# Patient Record
Sex: FEMALE | Race: WHITE | NOT HISPANIC OR LATINO | Employment: OTHER | URBAN - METROPOLITAN AREA
[De-identification: names, ages, dates, MRNs, and addresses within clinical notes are randomized per-mention and may not be internally consistent; named-entity substitution may affect disease eponyms.]

---

## 2017-01-09 RX ORDER — SIMVASTATIN 40 MG/1
TABLET, FILM COATED ORAL
Qty: 30 TABLET | Refills: 1 | Status: SHIPPED | OUTPATIENT
Start: 2017-01-09 | End: 2017-04-17 | Stop reason: SDUPTHER

## 2017-01-11 ENCOUNTER — OFFICE VISIT (OUTPATIENT)
Dept: PODIATRY | Facility: CLINIC | Age: 82
End: 2017-01-11
Payer: MEDICARE

## 2017-01-11 ENCOUNTER — TELEPHONE (OUTPATIENT)
Dept: PODIATRY | Facility: CLINIC | Age: 82
End: 2017-01-11

## 2017-01-11 VITALS
WEIGHT: 180 LBS | SYSTOLIC BLOOD PRESSURE: 130 MMHG | BODY MASS INDEX: 37.79 KG/M2 | HEIGHT: 58 IN | DIASTOLIC BLOOD PRESSURE: 74 MMHG

## 2017-01-11 DIAGNOSIS — M79.674 TOE PAIN, RIGHT: ICD-10-CM

## 2017-01-11 DIAGNOSIS — S92.501A FRACTURE OF FIFTH TOE, RIGHT, CLOSED, INITIAL ENCOUNTER: Primary | ICD-10-CM

## 2017-01-11 DIAGNOSIS — E11.49 TYPE II DIABETES MELLITUS WITH NEUROLOGICAL MANIFESTATIONS: ICD-10-CM

## 2017-01-11 PROCEDURE — 1157F ADVNC CARE PLAN IN RCRD: CPT | Mod: S$GLB,,, | Performed by: PODIATRIST

## 2017-01-11 PROCEDURE — 1125F AMNT PAIN NOTED PAIN PRSNT: CPT | Mod: S$GLB,,, | Performed by: PODIATRIST

## 2017-01-11 PROCEDURE — 3078F DIAST BP <80 MM HG: CPT | Mod: S$GLB,,, | Performed by: PODIATRIST

## 2017-01-11 PROCEDURE — 1160F RVW MEDS BY RX/DR IN RCRD: CPT | Mod: S$GLB,,, | Performed by: PODIATRIST

## 2017-01-11 PROCEDURE — 3075F SYST BP GE 130 - 139MM HG: CPT | Mod: S$GLB,,, | Performed by: PODIATRIST

## 2017-01-11 PROCEDURE — 99213 OFFICE O/P EST LOW 20 MIN: CPT | Mod: S$GLB,,, | Performed by: PODIATRIST

## 2017-01-11 PROCEDURE — 99999 PR PBB SHADOW E&M-EST. PATIENT-LVL III: CPT | Mod: PBBFAC,,, | Performed by: PODIATRIST

## 2017-01-11 PROCEDURE — 99499 UNLISTED E&M SERVICE: CPT | Mod: S$GLB,,, | Performed by: PODIATRIST

## 2017-01-11 PROCEDURE — 1159F MED LIST DOCD IN RCRD: CPT | Mod: S$GLB,,, | Performed by: PODIATRIST

## 2017-01-11 RX ORDER — DESONIDE 0.5 MG/G
OINTMENT TOPICAL
Refills: 1 | COMMUNITY
Start: 2016-10-05

## 2017-01-11 RX ORDER — HYDROCODONE BITARTRATE AND ACETAMINOPHEN 5; 325 MG/1; MG/1
1 TABLET ORAL EVERY 12 HOURS PRN
Qty: 30 TABLET | Refills: 0 | Status: SHIPPED | OUTPATIENT
Start: 2017-01-11 | End: 2017-03-07

## 2017-01-11 NOTE — PROGRESS NOTES
Subjective:      Patient ID: Ngoc Cruz is a 85 y.o. female.    Chief Complaint: Foot Pain (right foot little pcp Dr. Saleh 03/2016)    Ngoc is a 85 y.o. female who presents to the clinic for evaluation and treatment of high risk feet. Ngoc has a past medical history of Cataract; Diabetes mellitus type II; Glaucoma; Hyperlipidemia; Hypertension; and Osteoporosis. The patient's chief complaint is fracture of 5th toe right foot. Went to [facility] ED where they took an xray and confirmed broken toe. She was given a protective boot (she does not have it on today) and pain medication. Relates no pain in the toe, just some swelling. Does not recall any injury to the toe. Denies stubbing toe, denies dropping anything on it.  This patient has documented high risk feet requiring routine maintenance secondary to diabetes mellitis and those secondary complications of diabetes, as mentioned..    PCP: Hector Saleh MD    Date Last Seen by PCP:   Chief Complaint   Patient presents with    Foot Pain     right foot little pcp Dr. Saleh 03/2016       Current shoe gear:   Rx diabetic extra depth shoes and custom accommodative insoles    Hemoglobin A1C   Date Value Ref Range Status   02/29/2016 7.6 (A)  Final   08/05/2015 8.3 (H) 4.5 - 6.2 % Final   02/12/2015 8.9 (H) 4.5 - 6.2 % Final   08/11/2014 8.0 (H) 4.5 - 6.2 % Final       Patient Active Problem List   Diagnosis    Diabetes mellitus, type II    Hyperlipidemia    Hypertension    Osteoporosis, postmenopausal    Anxiety    Epidermal inclusion cyst    Skin lesion    Right foot pain    Urinary retention    Acute right hip pain    Right-sided low back pain with right-sided sciatica    SOB (shortness of breath)    Elevated d-dimer       Current Outpatient Prescriptions on File Prior to Visit   Medication Sig Dispense Refill    ACCU-CHEK FASTCLIX Misc   5    alendronate (FOSAMAX) 70 MG tablet   6    amlodipine (NORVASC) 10 MG tablet Take 1 tablet (10 mg  "total) by mouth once daily. 30 tablet 11    azithromycin (Z-TREVA) 250 MG tablet Take 2 tablets by mouth on day 1; Take 1 tablet by mouth on days 2-5 6 tablet 0    benzonatate (TESSALON PERLES) 100 MG capsule Take 1 capsule (100 mg total) by mouth 3 (three) times daily as needed for Cough. 30 capsule 1    blood sugar diagnostic (ACCU-CHEK SMARTVIEW TEST STRIP) Strp use with insulin AS DIRECTED as directed 100 strip 7    blood sugar diagnostic Strp test AS DIRECTED  0    blood-glucose meter (ACCU-CHEK DORCAS) Mis Please provide glucometer covered by the insurance company 1 each 1    dextromethorphan-guaifenesin  mg (MUCINEX DM)  mg per 12 hr tablet Take 1 tablet by mouth every 12 (twelve) hours.      diazepam (VALIUM) 2 MG tablet Take 1 tablet (2 mg total) by mouth daily as needed for Anxiety. 30 tablet 0    EASY TOUCH TWIST LANCETS 32 gauge Misc test DAILY AS DIRECTED  7    escitalopram oxalate (LEXAPRO) 10 MG tablet Take 1 tablet (10 mg total) by mouth once daily. 30 tablet 2    fluconazole (DIFLUCAN) 200 MG Tab Take 1 tablet (200 mg total) by mouth once daily. 2 tablet 0    fluocinonide (LIDEX) 0.05 % ointment 1 application 2 (two) times daily. Apply to affected area  0    FREESTYLE LANCETS 28 gauge lancets USE DAILY 100 each 5    gabapentin (NEURONTIN) 100 MG capsule Take 100 mg by mouth 3 (three) times daily.      hydrocodone-acetaminophen 7.5-325mg (NORCO) 7.5-325 mg per tablet Take 1 tablet by mouth every 6 (six) hours as needed. 15 tablet 0    insulin needles, disposable, (NOVOFINE 32) 32 x 1/4 " Ndle Inject 1 Units into the skin once daily. 100 each 1    lancets (FREESTYLE LANCETS) 28 gauge Misc Inject 1 lancet into the skin once daily. 100 each 1    LANTUS SOLOSTAR 100 unit/mL (3 mL) InPn pen inject 32 UNITS IN THE MORNING 9 mL 11    latanoprost (XALATAN) 0.005 % ophthalmic solution Place 1 drop into both eyes every evening.        lisinopril (PRINIVIL,ZESTRIL) 40 MG tablet Take " "1 tablet (40 mg total) by mouth once daily. 90 tablet 3    metoprolol tartrate (LOPRESSOR) 25 MG tablet Take 1 tablet (25 mg total) by mouth 2 (two) times daily. 60 tablet 11    miconazole (MICOTIN) 2 % cream Apply topically 2 (two) times daily. 30 g 0    naproxen (NAPROSYN) 500 MG tablet Take 1 tablet (500 mg total) by mouth 2 (two) times daily with meals. 60 tablet 2    nitrofurantoin (MACRODANTIN) 100 MG capsule Take 100 mg by mouth 4 (four) times daily.      NOVOFINE 32 32 gauge x 1/4" Ndle Inject 1 Units into the skin once daily. 100 each 0    NOVOFINE 32 32 gauge x 1/4" Ndle Inject 1 Units into the skin once daily. 100 each 1    polyethylene glycol (GLYCOLAX) 17 gram/dose powder   0    simvastatin (ZOCOR) 40 MG tablet TAKE ONE TABLET BY MOUTH IN THE EVENING 30 tablet 1    tizanidine 2 mg Cap Take by mouth.      triamcinolone acetonide 0.1% (KENALOG) 0.1 % ointment   0     No current facility-administered medications on file prior to visit.        Review of patient's allergies indicates:   Allergen Reactions    Bactrim [sulfamethoxazole-trimethoprim]     Cephalexin Other (See Comments)     Leg cramps    Codeine Itching    Demerol [meperidine]     Etodolac Diarrhea    Metformin Diarrhea    Naproxen     Sulfur     Terbinafine     Darvocet a500 [propoxyphene n-acetaminophen] Rash       Past Surgical History   Procedure Laterality Date    Cholecystectomy      Eye surgery      Hemorrhoid surgery      Hysterectomy      Vulvectomy      Excision thigh masses      Excision left axillary mass         Family History   Problem Relation Age of Onset    Diabetes Mother     Diabetes Son     Breast cancer Neg Hx     Colon cancer Neg Hx     Ovarian cancer Neg Hx        Social History     Social History    Marital status:      Spouse name: N/A    Number of children: N/A    Years of education: N/A     Occupational History    Not on file.     Social History Main Topics    Smoking status: " "Never Smoker    Smokeless tobacco: Never Used    Alcohol use No    Drug use: No    Sexual activity: Not Currently     Other Topics Concern    Not on file     Social History Narrative       Review of Systems   Constitution: Negative for chills, fever and weakness.   Cardiovascular: Positive for leg swelling (RLE). Negative for chest pain and claudication.        Varicosities   Respiratory: Negative for cough and shortness of breath.    Skin: Negative.  Negative for itching and rash.   Musculoskeletal: Positive for arthritis, back pain, joint pain and myalgias. Negative for falls, joint swelling and muscle weakness.        Toe swelling/pain   Gastrointestinal: Negative for diarrhea, nausea and vomiting.   Neurological: Positive for numbness and paresthesias. Negative for tremors.   Psychiatric/Behavioral: Negative for altered mental status and hallucinations.           Objective:       Vitals:    01/11/17 1311   BP: 130/74   Weight: 81.6 kg (180 lb)   Height: 4' 10" (1.473 m)   PainSc:   5   PainLoc: Toe       Physical Exam   Constitutional:  Non-toxic appearance. She does not have a sickly appearance. No distress.   Pt. is well-developed, well-nourished, appears stated age, in no acute distress, alert and oriented x 3. No evidence of depression, anxiety, or agitation. Calm, cooperative, and communicative. Appropriate interactions and affect.   Cardiovascular:   Pulses:       Dorsalis pedis pulses are 1+ on the right side, and 1+ on the left side.        Posterior tibial pulses are 1+ on the right side, and 1+ on the left side.    Dorsalis pedis and posterior tibial pulses are diminished Bilaterally. Toes are cool to touch. Feet are warm proximally.There is decreased digital hair. Skin is atrophic,mildly edematous toeral ankles       Pulmonary/Chest: No respiratory distress.   Musculoskeletal:        Right ankle: No tenderness. No lateral malleolus, no medial malleolus, no AITFL, no CF ligament and no posterior " TFL tenderness found. Achilles tendon exhibits no pain, no defect and normal Sotelo's test results.        Left ankle: No tenderness. No lateral malleolus, no medial malleolus, no AITFL, no CF ligament and no posterior TFL tenderness found. Achilles tendon exhibits no pain, no defect and normal Sotelo's test results.        Right foot: There is no tenderness and no bony tenderness.        Left foot: There is no tenderness and no bony tenderness.    Decreased stride, station of gait.  apropulsive toe off.  Increased angle and base of gait.      Patient has hammertoes of digits 2-5 bilateral partially reducible without symptom today.     Visible and palpable bunion without pain at dorsomedial 1st metatarsal head right and left.  Hallux abducted right and left partially reducible, tracks laterally without being track bound.  No ecchymosis, erythema, edema, or cardinal signs infection or signs of trauma same foot.     Fat pad atrophy to heels and met heads bilateral    Mild-moderate edema to 5th toe and lateral forefoot right. There is pain to palpation of base of 5th toe R foot. Pain on ROM of 5th MTPJ R.    Lymphadenopathy:   No lymphatic streaking    Negative lymphadenopathy bilateral popliteal fossa and tarsal tunnel.     Neurological:   [place]-Poly 5.07 monofilament is intact bilateral feet. Sharp/dull sensation is also intact Bilateral feet. Proprioception is grossly intact. Vibratory sensation intact (pt able to sense vibration stop within 3-5 seconds)     Skin: Skin is warm, dry and intact. No abrasion, no bruising, no burn, no ecchymosis and no rash noted. She is not diaphoretic. No cyanosis or erythema. No pallor. Nails show no clubbing.   Toenails x6  bilaterally are dystrophic but well trimmed.              Psychiatric: Her mood appears not anxious. Her affect is not inappropriate. Her speech is not slurred. She is not combative. She is communicative. She is attentive.   Nursing note reviewed.       "  Assessment:       Encounter Diagnoses   Name Primary?    Fracture of fifth toe, right, closed, initial encounter Yes    Toe pain, right     Type II diabetes mellitus with neurological manifestations          Plan:       Ngoc was seen today for foot pain.    Diagnoses and all orders for this visit:    Fracture of fifth toe, right, closed, initial encounter  -     X-Ray Foot Complete Right; Future    Toe pain, right  -     X-Ray Foot Complete Right; Future    Type II diabetes mellitus with neurological manifestations      I counseled the patient on her conditions, their implications and medical management.    Xray reviewed in detail with patient noting fractured proximal phalanx of 5th toe R foot.     Coban applied and used to devin splint 4th and 5th toes together. Provided with extra 1" coban. Advised on its use. Do not over tighten. Snug wrap around toes only.     Darco shoe provided and applied to R foot. Advised to use at all times while ambulating for minimum 6 weeks.     RTC 6 weeks for follow up Xray and reassessment.             "

## 2017-01-11 NOTE — MR AVS SNAPSHOT
[facility]  [address]  [place] [address]  Phone: [phone]                  Ngoc Cruz   2017 1:15 PM   Office Visit    Description:  Female : 1931   Provider:  Rajinder Molina DPM   Department:  [facility]           Reason for Visit     Foot Pain           Diagnoses this Visit        Comments    Fracture of fifth toe, right, closed, initial encounter    -  Primary     Toe pain, right         Type II diabetes mellitus with neurological manifestations                To Do List           Future Appointments        Provider Department Dept Phone    2017 1:30 PM Anya Lugo DPM [facility] [phone]      Goals (5 Years of Data)     None      [facility] On Call     [facility] On Call Nurse Care Line -  Assistance  Registered nurses in the [facility] On Call Center provide clinical advisement, health education, appointment booking, and other advisory services.  Call for this free service at [phone].             Medications           Message regarding Medications     Verify the changes and/or additions to your medication regime listed below are the same as discussed with your clinician today.  If any of these changes or additions are incorrect, please notify your healthcare provider.             Verify that the below list of medications is an accurate representation of the medications you are currently taking.  If none reported, the list may be blank. If incorrect, please contact your healthcare provider. Carry this list with you in case of emergency.           Current Medications     ACCU-CHEK FASTCLIX Misc     alendronate (FOSAMAX) 70 MG tablet     amlodipine (NORVASC) 10 MG tablet Take 1 tablet (10 mg total) by mouth once daily.    azithromycin (Z-TREVA) 250 MG tablet Take 2 tablets by mouth on day 1; Take 1 tablet by mouth on days 2-5    benzonatate (TESSALON PERLES) 100 MG capsule Take 1 capsule (100 mg total) by mouth 3 (three) times daily as needed  "for Cough.    blood sugar diagnostic (ACCU-CHEK SMARTVIEW TEST STRIP) Strp use with insulin AS DIRECTED as directed    blood sugar diagnostic Strp test AS DIRECTED    blood sugar diagnostic Strp test TWO TO THREE TIMES DAILY    blood-glucose meter (ACCU-CHEK DORCAS) Misc Please provide glucometer covered by the insurance company    desonide 0.05% (DESOWEN) 0.05 % Oint VICTOR HUGO AA BID RASH    dextromethorphan-guaifenesin  mg (MUCINEX DM)  mg per 12 hr tablet Take 1 tablet by mouth every 12 (twelve) hours.    diazepam (VALIUM) 2 MG tablet Take 1 tablet (2 mg total) by mouth daily as needed for Anxiety.    EASY TOUCH TWIST LANCETS 32 gauge Misc test DAILY AS DIRECTED    escitalopram oxalate (LEXAPRO) 10 MG tablet Take 1 tablet (10 mg total) by mouth once daily.    fluconazole (DIFLUCAN) 200 MG Tab Take 1 tablet (200 mg total) by mouth once daily.    fluocinonide (LIDEX) 0.05 % ointment 1 application 2 (two) times daily. Apply to affected area    FREESTYLE LANCETS 28 gauge lancets USE DAILY    gabapentin (NEURONTIN) 100 MG capsule Take 100 mg by mouth 3 (three) times daily.    hydrocodone-acetaminophen 7.5-325mg (NORCO) 7.5-325 mg per tablet Take 1 tablet by mouth every 6 (six) hours as needed.    insulin needles, disposable, (NOVOFINE 32) 32 x 1/4 " Ndle Inject 1 Units into the skin once daily.    lancets (FREESTYLE LANCETS) 28 gauge Misc Inject 1 lancet into the skin once daily.    LANTUS SOLOSTAR 100 unit/mL (3 mL) InPn pen inject 32 UNITS IN THE MORNING    latanoprost (XALATAN) 0.005 % ophthalmic solution Place 1 drop into both eyes every evening.      lisinopril (PRINIVIL,ZESTRIL) 40 MG tablet Take 1 tablet (40 mg total) by mouth once daily.    metoprolol tartrate (LOPRESSOR) 25 MG tablet Take 1 tablet (25 mg total) by mouth 2 (two) times daily.    miconazole (MICOTIN) 2 % cream Apply topically 2 (two) times daily.    naproxen (NAPROSYN) 500 MG tablet Take 1 tablet (500 mg total) by mouth 2 (two) times daily " "with meals.    nitrofurantoin (MACRODANTIN) 100 MG capsule Take 100 mg by mouth 4 (four) times daily.    NOVOFINE 32 32 gauge x 1/4" Ndle Inject 1 Units into the skin once daily.    NOVOFINE 32 32 gauge x 1/4" Ndle Inject 1 Units into the skin once daily.    polyethylene glycol (GLYCOLAX) 17 gram/dose powder     simvastatin (ZOCOR) 40 MG tablet TAKE ONE TABLET BY MOUTH IN THE EVENING    tizanidine 2 mg Cap Take by mouth.    triamcinolone acetonide 0.1% (KENALOG) 0.1 % ointment            Clinical Reference Information           Vital Signs - Last Recorded  Most recent update: 1/11/2017  1:14 PM by Hussein Warren MA    BP Ht Wt BMI       130/74 (BP Location: Right arm, Patient Position: Sitting, BP Method: Manual) 4' 10" (1.473 m) 81.6 kg (180 lb) 37.62 kg/m2       Blood Pressure          Most Recent Value    BP  130/74      Allergies as of 1/11/2017     Bactrim [Sulfamethoxazole-trimethoprim]    Cephalexin    Codeine    Demerol [Meperidine]    Etodolac    Metformin    Naproxen    Sulfur    Terbinafine    Darvocet A500 [Propoxyphene N-acetaminophen]      Immunizations Administered on Date of Encounter - 1/11/2017     None      [facility] Sign-Up     Activating your [facility] account is as easy as 1-2-3!     1) Visit [email or web address], select Sign Up Now, enter this activation code and your date of birth, then select Next.  [email or web address]  Expires: 2/14/2017  9:50 PM      2) Create a username and password to use when you visit [facility] in the future and select a security question in case you lose your password and select Next.    3) Enter your e-mail address and click Sign Up!    Additional Information  If you have questions, please e-mail [email or web address] or call [phone] to talk to our [facility] staff. Remember, [facility] is NOT to be used for urgent needs. For medical emergencies, dial 911.         "

## 2017-01-11 NOTE — TELEPHONE ENCOUNTER
----- Message from Josselyn Tran sent at 1/11/2017  1:58 PM CST -----  Contact: self  Pt is requesting a script for pain to sent to [facility]  [phone]

## 2017-03-07 ENCOUNTER — HOSPITAL ENCOUNTER (EMERGENCY)
Facility: OTHER | Age: 82
Discharge: HOME | End: 2017-03-08
Attending: EMERGENCY MEDICINE
Payer: MEDICARE

## 2017-03-07 DIAGNOSIS — S90.129S: Primary | ICD-10-CM

## 2017-03-07 DIAGNOSIS — S91.119A TOE LACERATION, INITIAL ENCOUNTER: ICD-10-CM

## 2017-03-07 DIAGNOSIS — R52 PAIN: ICD-10-CM

## 2017-03-07 LAB — POCT GLUCOSE: 268 MG/DL (ref 70–110)

## 2017-03-07 PROCEDURE — 99284 EMERGENCY DEPT VISIT MOD MDM: CPT

## 2017-03-07 PROCEDURE — 25000003 PHARM REV CODE 250: Performed by: EMERGENCY MEDICINE

## 2017-03-07 PROCEDURE — 82947 ASSAY GLUCOSE BLOOD QUANT: CPT

## 2017-03-07 RX ORDER — MUPIROCIN 20 MG/G
1 OINTMENT TOPICAL
Status: COMPLETED | OUTPATIENT
Start: 2017-03-07 | End: 2017-03-07

## 2017-03-07 RX ORDER — HYDROCODONE BITARTRATE AND ACETAMINOPHEN 5; 325 MG/1; MG/1
1 TABLET ORAL
Status: COMPLETED | OUTPATIENT
Start: 2017-03-07 | End: 2017-03-07

## 2017-03-07 RX ORDER — HYDROCODONE BITARTRATE AND ACETAMINOPHEN 5; 325 MG/1; MG/1
1 TABLET ORAL EVERY 12 HOURS PRN
Qty: 5 TABLET | Refills: 0 | Status: ON HOLD | OUTPATIENT
Start: 2017-03-07 | End: 2018-02-09

## 2017-03-07 RX ADMIN — MUPIROCIN 22 G: 20 OINTMENT TOPICAL at 11:03

## 2017-03-07 RX ADMIN — HYDROCODONE BITARTRATE AND ACETAMINOPHEN 1 TABLET: 5; 325 TABLET ORAL at 11:03

## 2017-03-07 NOTE — ED AVS SNAPSHOT
[facility] EMERGENCY DEPARTMENT  [address] [facility]  [place] [address]               Ngoc Henderson   3/7/2017 10:38 PM   ED    Description:  Female : 1931   Department:  [facility] Emergency Department           Your Care was Coordinated By:     Provider Role From To    Derik Reyna MD Attending Provider [phone] --      Reason for Visit     Toe Pain           Diagnoses this Visit        Comments    Contusion of toe without damage to nail, unspecified toe, sequela    -  Primary     Pain         Toe laceration, initial encounter           ED Disposition     ED Disposition Condition Comment    Discharge  Patient discharged to home in stable condition.              To Do List           Follow-up Information     Follow up with Hector Saleh MD In 1 day.    Specialties:  Internal Medicine, Oncology, Hematology and Oncology    Why:  for re-evaluation of today's complaint, and ongoing care    Contact information:    [address] [facility]  [address]  [place] [address]  [phone]         These Medications        Disp Refills Start End    hydrocodone-acetaminophen 5-325mg (NORCO) 5-325 mg per tablet 5 tablet 0 3/7/2017     Take 1 tablet by mouth every 12 (twelve) hours as needed for Pain. Severe pain - Oral    Pharmacy: [facility] [place]  [place] - [address] Ph #: [phone]         [facility] On Call     [facility] On Call Nurse Care Line -  Assistance  Registered nurses in the [facility] Call Center provide clinical advisement, health education, appointment booking, and other advisory services.  Call for this free service at [phone].             Medications           Message regarding Medications     Verify the changes and/or additions to your medication regime listed below are the same as discussed with your clinician today.  If any of these changes or additions are incorrect, please notify your healthcare provider.        These medications were  administered today        Dose Freq    mupirocin 2 % ointment 22 g 1 Tube ED 1 Time    Sig: Apply 22 g topically ED 1 Time.    Class: Normal    Route: Topical (Top)    hydrocodone-acetaminophen 5-325mg per tablet 1 tablet 1 tablet ED 1 Time    Sig: Take 1 tablet by mouth ED 1 Time.    Class: Normal    Route: Oral      CHANGE how you are taking these medications     Start Taking Instead of    hydrocodone-acetaminophen 5-325mg (NORCO) 5-325 mg per tablet hydrocodone-acetaminophen 5-325mg (NORCO) 5-325 mg per tablet    Dosage:  Take 1 tablet by mouth every 12 (twelve) hours as needed for Pain. Severe pain Dosage:  Take 1 tablet by mouth every 12 (twelve) hours as needed for Pain.           Verify that the below list of medications is an accurate representation of the medications you are currently taking.  If none reported, the list may be blank. If incorrect, please contact your healthcare provider. Carry this list with you in case of emergency.           Current Medications     ACCU-CHEK FASTCLIX Misc     alendronate (FOSAMAX) 70 MG tablet     amlodipine (NORVASC) 10 MG tablet Take 1 tablet (10 mg total) by mouth once daily.    azithromycin (Z-TREVA) 250 MG tablet Take 2 tablets by mouth on day 1; Take 1 tablet by mouth on days 2-5    blood sugar diagnostic (ACCU-CHEK SMARTVIEW TEST STRIP) Strp use with insulin AS DIRECTED as directed    blood sugar diagnostic Strp test AS DIRECTED    blood sugar diagnostic Strp test TWO TO THREE TIMES DAILY    blood-glucose meter (ACCU-CHEK DORCAS) Misc Please provide glucometer covered by the insurance company    desonide 0.05% (DESOWEN) 0.05 % Oint VICTOR HUGO AA BID RASH    dextromethorphan-guaifenesin  mg (MUCINEX DM)  mg per 12 hr tablet Take 1 tablet by mouth every 12 (twelve) hours.    diazepam (VALIUM) 2 MG tablet Take 1 tablet (2 mg total) by mouth daily as needed for Anxiety.    EASY TOUCH TWIST LANCETS 32 gauge Misc test DAILY AS DIRECTED    escitalopram oxalate (LEXAPRO)  "10 MG tablet Take 1 tablet (10 mg total) by mouth once daily.    fluconazole (DIFLUCAN) 200 MG Tab Take 1 tablet (200 mg total) by mouth once daily.    fluocinonide (LIDEX) 0.05 % ointment 1 application 2 (two) times daily. Apply to affected area    FREESTYLE LANCETS 28 gauge lancets USE DAILY    gabapentin (NEURONTIN) 100 MG capsule Take 100 mg by mouth 3 (three) times daily.    hydrocodone-acetaminophen 5-325mg (NORCO) 5-325 mg per tablet Take 1 tablet by mouth every 12 (twelve) hours as needed for Pain. Severe pain    insulin needles, disposable, (NOVOFINE 32) 32 x 1/4 " Ndle Inject 1 Units into the skin once daily.    lancets (FREESTYLE LANCETS) 28 gauge Misc Inject 1 lancet into the skin once daily.    LANTUS SOLOSTAR 100 unit/mL (3 mL) InPn pen inject 32 UNITS IN THE MORNING    latanoprost (XALATAN) 0.005 % ophthalmic solution Place 1 drop into both eyes every evening.      lisinopril (PRINIVIL,ZESTRIL) 40 MG tablet Take 1 tablet (40 mg total) by mouth once daily.    metoprolol tartrate (LOPRESSOR) 25 MG tablet Take 1 tablet (25 mg total) by mouth 2 (two) times daily.    miconazole (MICOTIN) 2 % cream Apply topically 2 (two) times daily.    naproxen (NAPROSYN) 500 MG tablet Take 1 tablet (500 mg total) by mouth 2 (two) times daily with meals.    nitrofurantoin (MACRODANTIN) 100 MG capsule Take 100 mg by mouth 4 (four) times daily.    NOVOFINE 32 32 gauge x 1/4" Ndle Inject 1 Units into the skin once daily.    NOVOFINE 32 32 gauge x 1/4" Ndle Inject 1 Units into the skin once daily.    polyethylene glycol (GLYCOLAX) 17 gram/dose powder     simvastatin (ZOCOR) 40 MG tablet TAKE ONE TABLET BY MOUTH IN THE EVENING    tizanidine 2 mg Cap Take by mouth.    triamcinolone acetonide 0.1% (KENALOG) 0.1 % ointment            Clinical Reference Information           Your Vitals Were     BP Pulse Temp Resp Height Weight    172/67 82 98.6 °F (37 °C) (Temporal) 18 4' 11" (1.499 m) 68 kg (150 lb)    SpO2 BMI             97% " 30.3 kg/m2         Allergies as of 3/7/2017        Reactions    Bactrim [Sulfamethoxazole-trimethoprim]     Cephalexin Other (See Comments)    Leg cramps    Codeine Itching    Demerol [Meperidine]     Etodolac Diarrhea    Metformin Diarrhea    Naproxen     Sulfur     Terbinafine     Darvocet A500 [Propoxyphene N-acetaminophen] Rash      Immunizations Administered on Date of Encounter - 3/7/2017     None      ED Micro, Lab, POCT     Start Ordered       Status Ordering Provider    03/07/17 2154 03/07/17 2154  POCT glucose  Once      Final result     03/07/17 2152 03/07/17 2151  POCT glucose  Once      Acknowledged       ED Imaging Orders     Start Ordered       Status Ordering Provider    03/07/17 2155 03/07/17 2154  X-Ray Toe 2 or more views  1 time imaging     Comments:  Right great toe    Final result         Discharge Instructions         Small or Superficial Laceration: Not Sutured  A laceration is a cut through the skin. A laceration requires stitches or staples if it is deep or spread open. A small laceration often doesn't require stitches.   You may need a tetanus shot. This may be given if you have no record of this vaccination and the object that caused the cut may lead to tetanus  Home care  · Your healthcare provider may prescribe an antibiotic. This is to help prevent infection. Follow all instructions for taking this medicine. Take the medicine every day until it is gone or you are told to stop. You should not have any left over.  · The healthcare provider may prescribe medicines for pain. Follow instructions for taking them.  · Follow the healthcare providers instructions on how to care for the cut.  · Wash your hands with soap and warm water before and after caring for cut. This helps prevent infection.  · Keep the wound clean and dry. If a bandage was applied and it becomes wet or dirty, replace it. Otherwise, leave it in place for the first 24 hours, then change it once a day or as  directed.  · Clean the wound daily:  ¨ After removing any bandage, wash the area with soap and water. Use a wet cotton swab to loosen and remove any blood or crust that forms.  ¨ After cleaning, keep the wound clean and dry. Talk with your doctor before applying any antibiotic ointment to the wound. Reapply a fresh bandage.  · You may remove the bandage to shower as usual after the first 24 hours, but do not soak the area in water (no tub baths or swimming) for the next 5 days.  · If the area gets wet, gently pat it dry with a clean cloth. Replace the wet bandage with a dry one.  · Avoid activities that may reinjure your wound.  · Do not scratch, rub, or pick at the area.  · Check the wound daily for signs of infection listed below.  Follow-up care  Follow up with your healthcare provider as advised.  When to seek medical advice  Call your healthcare provider right away if any of these occur:  · Wound bleeding not controlled by direct pressure  · Signs of infection, including increasing pain in the wound, increasing wound redness or swelling, or pus or bad odor coming from the wound  · Fever of 100.4°F (38ºC) or higher or as directed by your healthcare provider  · Stitches or staples come apart or fall out or surgical tape falls off before 7 days  · Wound edges re-open  · Wound changes colors  · Numbness around the wound   · Decreased movement around the injured area  Date Last Reviewed: 6/14/2015  © 6201-0987 [employer]. [address] [place] [address]. All rights reserved. This information is not intended as a substitute for professional medical care. Always follow your healthcare professional's instructions.          Bruises (Contusions)    A contusion is a bruise. A bruise happens when a blow to your body doesn't break the skin but does break blood vessels beneath the skin. Blood leaking from the broken vessels causes redness and swelling. As it heals, your bruise is likely to turn  colors like purple, green, and yellow. This is normal. The bruise should fade in 2 or 3 weeks.  Factors that make you more likely to bruise  Almost everyone bruises now and then. Certain people do bruise more easily than others. You're more prone to bruising as you get older. That's because blood vessels become more fragile with age. You're also more likely to bruise if you have a clotting disorder such as hemophilia or take medications that reduce clotting, including aspirin.  When to go to the emergency room (ER)  Bruises almost always heal on their own without special treatment. But for some people, a bad bruise can be serious. Seek medical care if you:  · Have a clotting disorder such as hemophilia.  · Have cirrhosis or other serious liver disease.  · Take blood-thinning medications such as warfarin (Coumadin).  What to expect in the ER  A doctor will examine your bruise and ask about any health conditions you have. In some cases, you may have a test to check how well your blood clots. Other treatment will depend on your needs.  Follow-up care  Sometimes a bruise gets worse instead of better. It may become larger and more swollen. This can occur when your body walls off a small pool of blood under the skin (hematoma). In very rare cases, your doctor may need to drain excess blood from the area.  Tip:  Apply an ice pack or bag of frozen peas to a bruise (keep a thin cloth between the cold source and your skin). This can help reduce redness and swelling.   Date Last Reviewed: 11/30/2014  © 1920-3057 [employer]. [address] [place] [address], [place] [address]. All rights reserved. This information is not intended as a substitute for professional medical care. Always follow your healthcare professional's instructions.          Your Scheduled Appointments     Mar 23, 2017  1:30 PM CDT   Diagnostic Xray with [facility] XR1 300 LB LIMIT   [facility] ([place]    [address] [place] [address]  [place]  [address]   [phone]            Mar 23, 2017  2:00 PM CDT   Established Patient Visit with Anya Lugo DPM   [facility]    [address]  [place] [address]   [phone]              [facility] Sign-Up     Activating your [facility] account is as easy as 1-2-3!     1) Visit [email or web address], select Sign Up Now, enter this activation code and your date of birth, then select Next.  [email or web address]  Expires: 4/21/2017 11:54 PM      2) Create a username and password to use when you visit [facility] in the future and select a security question in case you lose your password and select Next.    3) Enter your e-mail address and click Sign Up!    Additional Information  If you have questions, please e-mail [email or web address] or call [phone] to talk to our [facility] staff. Remember, [facility] is NOT to be used for urgent needs. For medical emergencies, dial 911.          [facility] Emergency Department complies with applicable Federal civil rights laws and does not discriminate on the basis of race, color, national origin, age, disability, or sex.        Language Assistance Services     ATTENTION: Language assistance services are available, free of charge. Please call [phone].      ATENCIÓN: Si habla español, tiene a graves disposición servicios gratuitos de asistencia lingüística. Llame al [phone].     CHÚ Ý: N?u b?n nói Ti?ng Vi?t, có các d?ch v? h? tr? ngôn ng? mi?n phí dành cho b?n. G?i s? [phone].

## 2017-03-08 VITALS
SYSTOLIC BLOOD PRESSURE: 144 MMHG | WEIGHT: 150 LBS | RESPIRATION RATE: 16 BRPM | TEMPERATURE: 99 F | DIASTOLIC BLOOD PRESSURE: 75 MMHG | HEART RATE: 73 BPM | HEIGHT: 59 IN | OXYGEN SATURATION: 99 % | BODY MASS INDEX: 30.24 KG/M2

## 2017-03-08 NOTE — DISCHARGE INSTRUCTIONS
Small or Superficial Laceration: Not Sutured  A laceration is a cut through the skin. A laceration requires stitches or staples if it is deep or spread open. A small laceration often doesn't require stitches.   You may need a tetanus shot. This may be given if you have no record of this vaccination and the object that caused the cut may lead to tetanus  Home care  · Your healthcare provider may prescribe an antibiotic. This is to help prevent infection. Follow all instructions for taking this medicine. Take the medicine every day until it is gone or you are told to stop. You should not have any left over.  · The healthcare provider may prescribe medicines for pain. Follow instructions for taking them.  · Follow the healthcare providers instructions on how to care for the cut.  · Wash your hands with soap and warm water before and after caring for cut. This helps prevent infection.  · Keep the wound clean and dry. If a bandage was applied and it becomes wet or dirty, replace it. Otherwise, leave it in place for the first 24 hours, then change it once a day or as directed.  · Clean the wound daily:  ¨ After removing any bandage, wash the area with soap and water. Use a wet cotton swab to loosen and remove any blood or crust that forms.  ¨ After cleaning, keep the wound clean and dry. Talk with your doctor before applying any antibiotic ointment to the wound. Reapply a fresh bandage.  · You may remove the bandage to shower as usual after the first 24 hours, but do not soak the area in water (no tub baths or swimming) for the next 5 days.  · If the area gets wet, gently pat it dry with a clean cloth. Replace the wet bandage with a dry one.  · Avoid activities that may reinjure your wound.  · Do not scratch, rub, or pick at the area.  · Check the wound daily for signs of infection listed below.  Follow-up care  Follow up with your healthcare provider as advised.  When to seek medical advice  Call your healthcare  provider right away if any of these occur:  · Wound bleeding not controlled by direct pressure  · Signs of infection, including increasing pain in the wound, increasing wound redness or swelling, or pus or bad odor coming from the wound  · Fever of 100.4°F (38ºC) or higher or as directed by your healthcare provider  · Stitches or staples come apart or fall out or surgical tape falls off before 7 days  · Wound edges re-open  · Wound changes colors  · Numbness around the wound   · Decreased movement around the injured area  Date Last Reviewed: 6/14/2015 © 2000-2016 [employer]. [address] [place] [address]. All rights reserved. This information is not intended as a substitute for professional medical care. Always follow your healthcare professional's instructions.          Bruises (Contusions)    A contusion is a bruise. A bruise happens when a blow to your body doesn't break the skin but does break blood vessels beneath the skin. Blood leaking from the broken vessels causes redness and swelling. As it heals, your bruise is likely to turn colors like purple, green, and yellow. This is normal. The bruise should fade in 2 or 3 weeks.  Factors that make you more likely to bruise  Almost everyone bruises now and then. Certain people do bruise more easily than others. You're more prone to bruising as you get older. That's because blood vessels become more fragile with age. You're also more likely to bruise if you have a clotting disorder such as hemophilia or take medications that reduce clotting, including aspirin.  When to go to the emergency room (ER)  Bruises almost always heal on their own without special treatment. But for some people, a bad bruise can be serious. Seek medical care if you:  · Have a clotting disorder such as hemophilia.  · Have cirrhosis or other serious liver disease.  · Take blood-thinning medications such as warfarin (Coumadin).  What to expect in the ER  A doctor will  examine your bruise and ask about any health conditions you have. In some cases, you may have a test to check how well your blood clots. Other treatment will depend on your needs.  Follow-up care  Sometimes a bruise gets worse instead of better. It may become larger and more swollen. This can occur when your body walls off a small pool of blood under the skin (hematoma). In very rare cases, your doctor may need to drain excess blood from the area.  Tip:  Apply an ice pack or bag of frozen peas to a bruise (keep a thin cloth between the cold source and your skin). This can help reduce redness and swelling.   Date Last Reviewed: 11/30/2014  © 7257-3822 [employer]. [address] [place] [address], [place] [address]. All rights reserved. This information is not intended as a substitute for professional medical care. Always follow your healthcare professional's instructions.

## 2017-03-08 NOTE — ED PROVIDER NOTES
Encounter Date: 3/7/2017       History     Chief Complaint   Patient presents with    Toe Pain     right great toe pain after dropping lotion bottle on it pta. stated started bleeding, hx of DM.     Review of patient's allergies indicates:   Allergen Reactions    Bactrim [sulfamethoxazole-trimethoprim]     Cephalexin Other (See Comments)     Leg cramps    Codeine Itching    Demerol [meperidine]     Etodolac Diarrhea    Metformin Diarrhea    Naproxen     Sulfur     Terbinafine     Darvocet a500 [propoxyphene n-acetaminophen] Rash     Patient is a 85 y.o. female presenting with the following complaint: foot injury.   Foot Injury    The incident occurred at home. The injury mechanism was a direct blow (dropped a bottle of lotion onto her foot). The incident occurred just prior to arrival. The pain is present in the right toes. The quality of the pain is described as throbbing. The pain has been constant since onset. Pertinent negatives include no numbness, no inability to bear weight, no loss of sensation and no tingling. She reports no foreign bodies present. The symptoms are aggravated by activity, palpation and bearing weight. She has tried nothing for the symptoms.   tetanus up to date within the last year.    Past Medical History:   Diagnosis Date    Cataract     Diabetes mellitus type II     Glaucoma     Hyperlipidemia     Hypertension     Osteoporosis      Past Surgical History:   Procedure Laterality Date    CHOLECYSTECTOMY      excision left axillary mass      excision thigh masses      EYE SURGERY      HEMORRHOID SURGERY      HYSTERECTOMY      VULVECTOMY       Family History   Problem Relation Age of Onset    Diabetes Mother     Diabetes Son     Breast cancer Neg Hx     Colon cancer Neg Hx     Ovarian cancer Neg Hx      Social History   Substance Use Topics    Smoking status: Never Smoker    Smokeless tobacco: Never Used    Alcohol use No     Review of Systems   Constitutional:  Negative for chills and fever.   HENT: Negative.    Eyes: Negative.    Respiratory: Negative for cough, shortness of breath and stridor.    Cardiovascular: Negative for chest pain and palpitations.   Gastrointestinal: Negative for nausea and vomiting.   Genitourinary: Negative for dysuria and hematuria.   Musculoskeletal: Positive for arthralgias and joint swelling.   Skin: Positive for wound.   Neurological: Negative for dizziness, tingling, numbness and headaches.   Psychiatric/Behavioral: Negative.    All other systems reviewed and are negative.      Physical Exam   Initial Vitals   BP Pulse Resp Temp SpO2   03/07/17 2149 03/07/17 2149 03/07/17 2149 03/07/17 2149 03/07/17 2149   172/67 82 18 98.6 °F (37 °C) 97 %     Physical Exam    Nursing note and vitals reviewed.  Constitutional: She appears well-developed and well-nourished. She is not diaphoretic. No distress.   HENT:   Head: Normocephalic and atraumatic.   Mouth/Throat: Oropharynx is clear and moist. No oropharyngeal exudate.   Eyes: EOM are normal. Pupils are equal, round, and reactive to light.   Neck: Normal range of motion. Neck supple.   Cardiovascular: Normal rate, regular rhythm and intact distal pulses.   No murmur heard.  Pulmonary/Chest: Breath sounds normal. No stridor. No respiratory distress.   Abdominal: Soft. Bowel sounds are normal. There is no tenderness.   Musculoskeletal: Normal range of motion. She exhibits tenderness. She exhibits no edema.        Right foot: There is tenderness, bony tenderness, swelling and laceration. There is normal range of motion, normal capillary refill, no crepitus and no deformity.        Feet:    Neurological: She is alert and oriented to person, place, and time. She has normal strength. No cranial nerve deficit.   Skin: Skin is warm and dry. No erythema. No pallor.   Psychiatric: She has a normal mood and affect.             ED Course   Procedures  Labs Reviewed   POCT GLUCOSE - Abnormal; Notable for the  following:        Result Value    POCT Glucose 268 (*)     All other components within normal limits   POCT GLUCOSE                               ED Course     Imaging Reviewed    Imaging Results         X-Ray Toe 2 or more views (Final result) Result time:  03/07/17 22:18:26    Final result by Interface, Rad Results In (03/07/17 22:18:26)    Narrative:    Study Desc:   XR TOE 2 VIEW  Clinical History: Dropped jar on 1st toe     EXAM: Right Great Toe  Xray     IMAGES:   2 views total     COMPARISONS: None     FINDINGS:  There is no fracture or dislocation appreciated.  Hallux valgus deformity noted.     IMPRESSION:  No acute pathology appreciated.  If ongoing clinical concern, follow up imaging   recommended.     SL: 24 Signed by: Suraj Mcintosh MD.  2017-03-07 22:18:25              Medications given in ED    Medications   mupirocin 2 % ointment 22 g (22 g Topical (Top) Given 3/7/17 2348)   hydrocodone-acetaminophen 5-325mg per tablet 1 tablet (1 tablet Oral Given 3/7/17 2348)       Discharge Medications     Medication List with Changes/Refills   Current Medications    ACCU-CHEK FASTCLIX MISC        ALENDRONATE (FOSAMAX) 70 MG TABLET        AMLODIPINE (NORVASC) 10 MG TABLET    Take 1 tablet (10 mg total) by mouth once daily.    AZITHROMYCIN (Z-TREVA) 250 MG TABLET    Take 2 tablets by mouth on day 1; Take 1 tablet by mouth on days 2-5    BLOOD SUGAR DIAGNOSTIC (ACCU-CHEK SMARTVIEW TEST STRIP) STRP    use with insulin AS DIRECTED as directed    BLOOD SUGAR DIAGNOSTIC STRP    test AS DIRECTED    BLOOD SUGAR DIAGNOSTIC STRP    test TWO TO THREE TIMES DAILY    BLOOD-GLUCOSE METER (ACCU-CHEK DORCAS) MISC    Please provide glucometer covered by the insurance company    DESONIDE 0.05% (DESOWEN) 0.05 % OINT    VICTOR HUGO AA BID RASH    DEXTROMETHORPHAN-GUAIFENESIN  MG (MUCINEX DM)  MG PER 12 HR TABLET    Take 1 tablet by mouth every 12 (twelve) hours.    DIAZEPAM (VALIUM) 2 MG TABLET    Take 1 tablet (2 mg total) by mouth  "daily as needed for Anxiety.    EASY TOUCH TWIST LANCETS 32 GAUGE MISC    test DAILY AS DIRECTED    ESCITALOPRAM OXALATE (LEXAPRO) 10 MG TABLET    Take 1 tablet (10 mg total) by mouth once daily.    FLUCONAZOLE (DIFLUCAN) 200 MG TAB    Take 1 tablet (200 mg total) by mouth once daily.    FLUOCINONIDE (LIDEX) 0.05 % OINTMENT    1 application 2 (two) times daily. Apply to affected area    FREESTYLE LANCETS 28 GAUGE LANCETS    USE DAILY    GABAPENTIN (NEURONTIN) 100 MG CAPSULE    Take 100 mg by mouth 3 (three) times daily.    INSULIN NEEDLES, DISPOSABLE, (NOVOFINE 32) 32 X 1/4 " NDLE    Inject 1 Units into the skin once daily.    LANCETS (FREESTYLE LANCETS) 28 GAUGE MISC    Inject 1 lancet into the skin once daily.    LANTUS SOLOSTAR 100 UNIT/ML (3 ML) INPN PEN    inject 32 UNITS IN THE MORNING    LATANOPROST (XALATAN) 0.005 % OPHTHALMIC SOLUTION    Place 1 drop into both eyes every evening.      LISINOPRIL (PRINIVIL,ZESTRIL) 40 MG TABLET    Take 1 tablet (40 mg total) by mouth once daily.    METOPROLOL TARTRATE (LOPRESSOR) 25 MG TABLET    Take 1 tablet (25 mg total) by mouth 2 (two) times daily.    MICONAZOLE (MICOTIN) 2 % CREAM    Apply topically 2 (two) times daily.    NAPROXEN (NAPROSYN) 500 MG TABLET    Take 1 tablet (500 mg total) by mouth 2 (two) times daily with meals.    NITROFURANTOIN (MACRODANTIN) 100 MG CAPSULE    Take 100 mg by mouth 4 (four) times daily.    NOVOFINE 32 32 GAUGE X 1/4" NDLE    Inject 1 Units into the skin once daily.    NOVOFINE 32 32 GAUGE X 1/4" NDLE    Inject 1 Units into the skin once daily.    POLYETHYLENE GLYCOL (GLYCOLAX) 17 GRAM/DOSE POWDER        SIMVASTATIN (ZOCOR) 40 MG TABLET    TAKE ONE TABLET BY MOUTH IN THE EVENING    TIZANIDINE 2 MG CAP    Take by mouth.    TRIAMCINOLONE ACETONIDE 0.1% (KENALOG) 0.1 % OINTMENT       Changed and/or Refilled Medications    Modified Medication Previous Medication    HYDROCODONE-ACETAMINOPHEN 5-325MG (NORCO) 5-325 MG PER TABLET " hydrocodone-acetaminophen 5-325mg (NORCO) 5-325 mg per tablet       Take 1 tablet by mouth every 12 (twelve) hours as needed for Pain. Severe pain    Take 1 tablet by mouth every 12 (twelve) hours as needed for Pain.             Patient discharged to home in stable condition with instructions to:   1. Please take all meds as prescribed.  2. Follow-up with your primary care doctor   3. Return precautions discussed and patient and/or family/caretaker understands to return to the emergency room for any concerns including worsening of your current symptoms, fever, chills, night sweats, worsening pain, chest pain, shortness of breath, nausea, vomiting, diarrhea, bleeding, headache, difficulty talking, visual disturbances, weakness, numbness or any other acute concerns    Clinical Impression:   The primary encounter diagnosis was Contusion of toe without damage to nail, unspecified toe, sequela. Diagnoses of Pain and Toe laceration, initial encounter were also pertinent to this visit.          Derik Reyna MD  03/07/17 1804

## 2017-03-14 ENCOUNTER — TELEPHONE (OUTPATIENT)
Dept: PODIATRY | Facility: CLINIC | Age: 82
End: 2017-03-14

## 2017-03-14 NOTE — TELEPHONE ENCOUNTER
----- Message from Josselyn Tran sent at 3/14/2017  2:22 PM CDT -----  Contact: self  Pt would like a sooner appt.(her appt 03/23) please advise  [phone]

## 2017-03-17 ENCOUNTER — HOSPITAL ENCOUNTER (EMERGENCY)
Facility: OTHER | Age: 82
Discharge: HOME | End: 2017-03-17
Attending: EMERGENCY MEDICINE
Payer: MEDICARE

## 2017-03-17 VITALS
DIASTOLIC BLOOD PRESSURE: 69 MMHG | HEIGHT: 59 IN | SYSTOLIC BLOOD PRESSURE: 150 MMHG | RESPIRATION RATE: 16 BRPM | HEART RATE: 81 BPM | WEIGHT: 160 LBS | BODY MASS INDEX: 32.25 KG/M2 | OXYGEN SATURATION: 98 % | TEMPERATURE: 98 F

## 2017-03-17 DIAGNOSIS — L03.90 WOUND CELLULITIS: Primary | ICD-10-CM

## 2017-03-17 LAB
ALBUMIN SERPL-MCNC: 3.7 G/DL (ref 3.3–5.5)
ALP SERPL-CCNC: 109 U/L (ref 42–141)
BILIRUB SERPL-MCNC: 0.5 MG/DL (ref 0.2–1.6)
BUN SERPL-MCNC: 11 MG/DL (ref 7–22)
CALCIUM SERPL-MCNC: 9.4 MG/DL (ref 8–10.3)
CHLORIDE SERPL-SCNC: 92 MMOL/L (ref 98–108)
CREAT SERPL-MCNC: 0.6 MG/DL (ref 0.6–1.2)
GLUCOSE SERPL-MCNC: 339 MG/DL (ref 73–118)
POC ALT (SGPT): 14 U/L (ref 10–47)
POC AST (SGOT): 18 U/L (ref 11–38)
POC TCO2: 27 MMOL/L (ref 18–33)
POCT GLUCOSE: 154 MG/DL (ref 70–110)
POCT GLUCOSE: 391 MG/DL (ref 70–110)
POTASSIUM BLD-SCNC: 4.1 MMOL/L (ref 3.6–5.1)
PROTEIN, POC: 7 G/DL (ref 6.4–8.1)
SODIUM BLD-SCNC: 137 MMOL/L (ref 128–145)

## 2017-03-17 PROCEDURE — 96374 THER/PROPH/DIAG INJ IV PUSH: CPT

## 2017-03-17 PROCEDURE — 25000003 PHARM REV CODE 250: Performed by: INTERNAL MEDICINE

## 2017-03-17 PROCEDURE — 25000003 PHARM REV CODE 250: Performed by: EMERGENCY MEDICINE

## 2017-03-17 PROCEDURE — 99283 EMERGENCY DEPT VISIT LOW MDM: CPT | Mod: 25

## 2017-03-17 PROCEDURE — 96361 HYDRATE IV INFUSION ADD-ON: CPT

## 2017-03-17 PROCEDURE — 63600175 PHARM REV CODE 636 W HCPCS: Performed by: EMERGENCY MEDICINE

## 2017-03-17 PROCEDURE — 96372 THER/PROPH/DIAG INJ SC/IM: CPT

## 2017-03-17 RX ORDER — SODIUM CHLORIDE 9 MG/ML
500 INJECTION, SOLUTION INTRAVENOUS
Status: COMPLETED | OUTPATIENT
Start: 2017-03-17 | End: 2017-03-17

## 2017-03-17 RX ORDER — CLINDAMYCIN HYDROCHLORIDE 150 MG/1
300 CAPSULE ORAL EVERY 6 HOURS
Qty: 56 CAPSULE | Refills: 0 | Status: ON HOLD | OUTPATIENT
Start: 2017-03-17 | End: 2018-02-09 | Stop reason: HOSPADM

## 2017-03-17 RX ORDER — CLINDAMYCIN PHOSPHATE 150 MG/ML
150 INJECTION, SOLUTION INTRAVENOUS
Status: COMPLETED | OUTPATIENT
Start: 2017-03-17 | End: 2017-03-17

## 2017-03-17 RX ORDER — IBUPROFEN 200 MG
24 TABLET ORAL
Status: DISCONTINUED | OUTPATIENT
Start: 2017-03-17 | End: 2017-03-17

## 2017-03-17 RX ORDER — TRAMADOL HYDROCHLORIDE 50 MG/1
50 TABLET ORAL EVERY 4 HOURS PRN
Qty: 20 TABLET | Refills: 0 | Status: SHIPPED | OUTPATIENT
Start: 2017-03-17 | End: 2017-03-27

## 2017-03-17 RX ORDER — GLUCAGON 1 MG
1 KIT INJECTION
Status: DISCONTINUED | OUTPATIENT
Start: 2017-03-17 | End: 2017-03-17

## 2017-03-17 RX ORDER — TRAMADOL HYDROCHLORIDE 50 MG/1
50 TABLET ORAL
Status: COMPLETED | OUTPATIENT
Start: 2017-03-17 | End: 2017-03-17

## 2017-03-17 RX ORDER — IBUPROFEN 200 MG
16 TABLET ORAL
Status: DISCONTINUED | OUTPATIENT
Start: 2017-03-17 | End: 2017-03-17

## 2017-03-17 RX ADMIN — TRAMADOL HYDROCHLORIDE 50 MG: 50 TABLET, COATED ORAL at 09:03

## 2017-03-17 RX ADMIN — SODIUM CHLORIDE 500 ML: 0.9 INJECTION, SOLUTION INTRAVENOUS at 09:03

## 2017-03-17 RX ADMIN — INSULIN HUMAN 10 UNITS: 100 INJECTION, SOLUTION PARENTERAL at 09:03

## 2017-03-17 RX ADMIN — CLINDAMYCIN PHOSPHATE 150 MG: 150 INJECTION, SOLUTION INTRAVENOUS at 09:03

## 2017-03-17 NOTE — ED AVS SNAPSHOT
[facility] EMERGENCY DEPARTMENT  [address] [facility] [address]               Ngoc Henderson   3/17/2017  8:49 PM   ED    Description:  Female : 1931   Department:  [facility]           Your Care was Coordinated By:     Provider Role From To    Hany Abraham MD Attending Provider 17 --      Reason for Visit     Toe Pain           Diagnoses this Visit        Comments    Wound cellulitis    -  Primary       ED Disposition     ED Disposition Condition Comment    Discharge             To Do List           Follow-up Information     Follow up with Hector Saleh MD In 1 week(s).    Specialties:  Internal Medicine, Oncology, Hematology and Oncology    Contact information:    [address]  [address]  [place] [address]  [phone]          Follow up with Hector Saleh MD In 3 days.    Specialties:  Internal Medicine, Oncology, Hematology and Oncology    Contact information:    [address]  [address]  [place] [address]  [phone]         These Medications        Disp Refills Start End    clindamycin (CLEOCIN) 150 MG capsule 56 capsule 0 3/17/2017     Take 2 capsules (300 mg total) by mouth every 6 (six) hours. - Oral    Pharmacy: [facility] [address] [facility] Ph #: [phone]       tramadol (ULTRAM) 50 mg tablet 20 tablet 0 3/17/2017 3/27/2017    Take 1 tablet (50 mg total) by mouth every 4 (four) hours as needed for Pain. - Oral    Pharmacy: [facility] ANDREAS Davila [address] Ph #: [phone]         [facility]     [facility] On [facility] Care [facility] -  Assistance  Registered nurses in the [facility] provide clinical advisement, health education, appointment booking, and other advisory services.  Call for this free service at [phone].             Medications           Message regarding Medications     Verify the changes and/or additions  to your medication regime listed below are the same as discussed with your clinician today.  If any of these changes or additions are incorrect, please notify your healthcare provider.        START taking these NEW medications        Refills    clindamycin (CLEOCIN) 150 MG capsule 0    Sig: Take 2 capsules (300 mg total) by mouth every 6 (six) hours.    Class: Print    Route: Oral    tramadol (ULTRAM) 50 mg tablet 0    Sig: Take 1 tablet (50 mg total) by mouth every 4 (four) hours as needed for Pain.    Class: Print    Route: Oral      These medications were administered today        Dose Freq    0.9%  NaCl infusion 500 mL ED 1 Time    Sig: Inject 500 mLs into the vein ED 1 Time.    Class: Normal    Route: Intravenous    tramadol tablet 50 mg 50 mg ED 1 Time    Sig: Take 1 tablet (50 mg total) by mouth ED 1 Time.    Class: Normal    Route: Oral    clindamycin injection 150 mg 150 mg ED 1 Time    Sig: Inject 1 mL (150 mg total) into the muscle ED 1 Time.    Class: Normal    Route: Intramuscular    insulin regular injection 10 Units 10 Units ED 1 Time    Sig: Inject 10 Units into the vein ED 1 Time.    Class: Normal    Route: Intravenous           Verify that the below list of medications is an accurate representation of the medications you are currently taking.  If none reported, the list may be blank. If incorrect, please contact your healthcare provider. Carry this list with you in case of emergency.           Current Medications     ACCU-CHEK FASTCLIX Misc     alendronate (FOSAMAX) 70 MG tablet     amlodipine (NORVASC) 10 MG tablet Take 1 tablet (10 mg total) by mouth once daily.    azithromycin (Z-TREVA) 250 MG tablet Take 2 tablets by mouth on day 1; Take 1 tablet by mouth on days 2-5    blood sugar diagnostic (ACCU-CHEK SMARTVIEW TEST STRIP) Strp use with insulin AS DIRECTED as directed    blood sugar diagnostic Strp test AS DIRECTED    blood sugar diagnostic Strp test TWO TO THREE TIMES DAILY    blood-glucose  "meter (ACCU-CHEK DORCAS) Mis Please provide glucometer covered by the insurance company    clindamycin (CLEOCIN) 150 MG capsule Take 2 capsules (300 mg total) by mouth every 6 (six) hours.    desonide 0.05% (DESOWEN) 0.05 % Oint VICTOR HUGO AA BID RASH    dextromethorphan-guaifenesin  mg (MUCINEX DM)  mg per 12 hr tablet Take 1 tablet by mouth every 12 (twelve) hours.    diazepam (VALIUM) 2 MG tablet Take 1 tablet (2 mg total) by mouth daily as needed for Anxiety.    EASY TOUCH TWIST LANCETS 32 gauge Misc test DAILY AS DIRECTED    escitalopram oxalate (LEXAPRO) 10 MG tablet Take 1 tablet (10 mg total) by mouth once daily.    fluconazole (DIFLUCAN) 200 MG Tab Take 1 tablet (200 mg total) by mouth once daily.    fluocinonide (LIDEX) 0.05 % ointment 1 application 2 (two) times daily. Apply to affected area    FREESTYLE LANCETS 28 gauge lancets USE DAILY    gabapentin (NEURONTIN) 100 MG capsule Take 100 mg by mouth 3 (three) times daily.    hydrocodone-acetaminophen 5-325mg (NORCO) 5-325 mg per tablet Take 1 tablet by mouth every 12 (twelve) hours as needed for Pain. Severe pain    insulin needles, disposable, (NOVOFINE 32) 32 x 1/4 " Ndle Inject 1 Units into the skin once daily.    lancets (FREESTYLE LANCETS) 28 gauge Misc Inject 1 lancet into the skin once daily.    LANTUS SOLOSTAR 100 unit/mL (3 mL) InPn pen inject 32 UNITS IN THE MORNING    latanoprost (XALATAN) 0.005 % ophthalmic solution Place 1 drop into both eyes every evening.      lisinopril (PRINIVIL,ZESTRIL) 40 MG tablet Take 1 tablet (40 mg total) by mouth once daily.    metoprolol tartrate (LOPRESSOR) 25 MG tablet Take 1 tablet (25 mg total) by mouth 2 (two) times daily.    miconazole (MICOTIN) 2 % cream Apply topically 2 (two) times daily.    naproxen (NAPROSYN) 500 MG tablet Take 1 tablet (500 mg total) by mouth 2 (two) times daily with meals.    nitrofurantoin (MACRODANTIN) 100 MG capsule Take 100 mg by mouth 4 (four) times daily.    NOVOFINE 32 32 " "gauge x 1/4" Ndle Inject 1 Units into the skin once daily.    NOVOFINE 32 32 gauge x 1/4" Ndle Inject 1 Units into the skin once daily.    polyethylene glycol (GLYCOLAX) 17 gram/dose powder     simvastatin (ZOCOR) 40 MG tablet TAKE ONE TABLET BY MOUTH IN THE EVENING    tizanidine 2 mg Cap Take by mouth.    tramadol (ULTRAM) 50 mg tablet Take 1 tablet (50 mg total) by mouth every 4 (four) hours as needed for Pain.    triamcinolone acetonide 0.1% (KENALOG) 0.1 % ointment            Clinical Reference Information           Your Vitals Were     BP Pulse Temp Resp Height Weight    155/70 89 96.2 °F (35.7 °C) 18 4' 11" (1.499 m) 72.6 kg (160 lb)    SpO2 BMI             100% 32.32 kg/m2         Allergies as of 3/17/2017        Reactions    Bactrim [Sulfamethoxazole-trimethoprim]     Cephalexin Other (See Comments)    Leg cramps    Codeine Itching    Demerol [Meperidine]     Etodolac Diarrhea    Metformin Diarrhea    Naproxen     Sulfur     Terbinafine     Darvocet A500 [Propoxyphene N-acetaminophen] Rash      Immunizations Administered on Date of Encounter - 3/17/2017     None      ED Micro, Lab, POCT     Start Ordered       Status Ordering Provider    03/17/17 2200 03/17/17 2053    4 times daily before meals & at bedtime,   Status:  Canceled      Canceled     03/17/17 2131 03/17/17 2131  POCT CMP  Once      Final result     03/17/17 2054 03/17/17 2053    Once,   Status:  Canceled      Canceled     03/17/17 2054 03/17/17 2053  POCT CMP  Once      Completed     03/17/17 2053 03/17/17 2053    Once,   Status:  Canceled      Canceled     03/17/17 2001 03/17/17 2001  POCT glucose  Once      Final result       ED Imaging Orders     None        Discharge Instructions           Monitor your blood sugar closely    Recognizing and Treating Wound Infection  Wounds can become infected with harmful germs (bacteria). This prevents healing. It also increases your risk of scars. In some cases, the infection may spread to other parts of " your body. And infection with the bacteria that cause tetanus can be fatal. Know what to look for and get prompt treatment for infection.    Risk factors  A wound is more likely to become infected if it:  · Results from a hole (puncture), such as from a nail or piece of glass  · Results from a human or animal bite  · Isn't cleaned or treated within 8 hours  · Occurs in your hand, foot, leg, armpit, or groin (the area where your belly meets your thighs)  · Contains dirt or saliva  · Heals very slowly  · Occurs in a person with diabetes, alcoholism, or a compromised immune system    Symptoms of infection  Call your healthcare provider at the first sign of infection, such as:  · Yellow, yellow-green, or foul-smelling drainage from a wound  · More pain, swelling, or redness in or near a wound  · A change in the color or size of a wound  · Red streaks in the skin around the wound  · Fever  Treatment  Treatment is likely to depend on the type of infection you have, and how serious it is. Your healthcare provider may prescribe oral antibiotics to help fight bacteria. Your provider may also flush the wound with an antibiotic solution or apply an antibiotic ointment. Sometimes a pocket of pus (abscess) may form. In that case, the abscess will be opened and the fluid drained. You may need hospital care if the infection is very severe.  Preventing wound infection  Follow these steps to help keep wounds from getting infected:  · Wash the wound right away with soap and water.  · Apply a small amount of antibiotic ointment. You can buy this without a prescription.  · Cover wounds with a bandage or gauze dressing. Change daily.  · Keep the wound clean and dry for the first 24 hours.  · Change the dressing daily using sterile gloves.   Date Last Reviewed: 8/13/2015  © 9558-0830 [employer]. [address] [place] [address], [place] [address]. All rights reserved. This information is not intended as a substitute for professional  medical care. Always follow your healthcare professional's instructions.          Your Scheduled Appointments     Mar 23, 2017  1:30 PM CDT   Diagnostic Xray with [facility] XR1 300 LB LIMIT   [facility]    [address] [facility] [place] [address] [phone]            Mar 23, 2017  2:00 PM CDT   Established Patient Visit with Anya Lugo DPM   [facility]    [address] [facility] [place] [place] [address] [phone]              [facility] Sign-Up     Activating your [facility] account is as easy as 1-2-3!     1) Visit [email or web address], select Sign Up Now, enter this activation code and your date of birth, then select Next.  [email or web address]  Expires: 4/22/2017 12:54 AM      2) Create a username and password to use when you visit [facility] in the future and select a security question in case you lose your password and select Next.    3) Enter your e-mail address and click Sign Up!    Additional Information  If you have questions, please e-mail [email or web address] or call [phone] to talk to our [facility] staff. Remember, [facility] is NOT to be used for urgent needs. For medical emergencies, dial 911.          [facility] Emergency Department complies with applicable Federal civil rights laws and does not discriminate on the basis of race, color, national origin, age, disability, or sex.        Language Assistance Services     ATTENTION: Language assistance services are available, free of charge. Please call [phone].      ATENCIÓN: Si habla español, tiene a graves disposición servicios gratuitos de asistencia lingüística. Llame al [phone].     CHÚ Ý: N?u b?n nói Ti?ng Vi?t, có các d?ch v? h? tr? ngôn ng? mi?n phí dành cho b?n. G?i s? [phone].

## 2017-03-18 NOTE — DISCHARGE INSTRUCTIONS
Monitor your blood sugar closely    Recognizing and Treating Wound Infection  Wounds can become infected with harmful germs (bacteria). This prevents healing. It also increases your risk of scars. In some cases, the infection may spread to other parts of your body. And infection with the bacteria that cause tetanus can be fatal. Know what to look for and get prompt treatment for infection.    Risk factors  A wound is more likely to become infected if it:  · Results from a hole (puncture), such as from a nail or piece of glass  · Results from a human or animal bite  · Isn't cleaned or treated within 8 hours  · Occurs in your hand, foot, leg, armpit, or groin (the area where your belly meets your thighs)  · Contains dirt or saliva  · Heals very slowly  · Occurs in a person with diabetes, alcoholism, or a compromised immune system    Symptoms of infection  Call your healthcare provider at the first sign of infection, such as:  · Yellow, yellow-green, or foul-smelling drainage from a wound  · More pain, swelling, or redness in or near a wound  · A change in the color or size of a wound  · Red streaks in the skin around the wound  · Fever  Treatment  Treatment is likely to depend on the type of infection you have, and how serious it is. Your healthcare provider may prescribe oral antibiotics to help fight bacteria. Your provider may also flush the wound with an antibiotic solution or apply an antibiotic ointment. Sometimes a pocket of pus (abscess) may form. In that case, the abscess will be opened and the fluid drained. You may need hospital care if the infection is very severe.  Preventing wound infection  Follow these steps to help keep wounds from getting infected:  · Wash the wound right away with soap and water.  · Apply a small amount of antibiotic ointment. You can buy this without a prescription.  · Cover wounds with a bandage or gauze dressing. Change daily.  · Keep the wound clean and dry for the first 24  hours.  · Change the dressing daily using sterile gloves.   Date Last Reviewed: 8/13/2015  © 4285-4485 [employer]. [address] [place] [address], [place] [address]. All rights reserved. This information is not intended as a substitute for professional medical care. Always follow your healthcare professional's instructions.

## 2017-03-18 NOTE — ED PROVIDER NOTES
Encounter Date: 3/17/2017       History     Chief Complaint   Patient presents with    Toe Pain     Review of patient's allergies indicates:   Allergen Reactions    Bactrim [sulfamethoxazole-trimethoprim]     Cephalexin Other (See Comments)     Leg cramps    Codeine Itching    Demerol [meperidine]     Etodolac Diarrhea    Metformin Diarrhea    Naproxen     Sulfur     Terbinafine     Darvocet a500 [propoxyphene n-acetaminophen] Rash     Patient is a 85 y.o. female presenting with the following complaint: foot injury. The history is provided by the patient.   Foot Injury    The incident occurred at home. The injury mechanism was a direct blow (Patient dropped a bottle on her foot the other night and was seen in the emergency department with a negative x-ray.  She essentially developed an infection in the toe.). The incident occurred several days ago. The pain is present in the right toes. The quality of the pain is described as sharp. The pain is at a severity of 3/10. The pain has been constant since onset. Associated symptoms include loss of motion. She reports no foreign bodies present. The symptoms are aggravated by activity, bearing weight and palpation.     Past Medical History:   Diagnosis Date    Cataract     Diabetes mellitus type II     Glaucoma     Hyperlipidemia     Hypertension     Osteoporosis      Past Surgical History:   Procedure Laterality Date    CHOLECYSTECTOMY      excision left axillary mass      excision thigh masses      EYE SURGERY      HEMORRHOID SURGERY      HYSTERECTOMY      VULVECTOMY       Family History   Problem Relation Age of Onset    Diabetes Mother     Diabetes Son     Breast cancer Neg Hx     Colon cancer Neg Hx     Ovarian cancer Neg Hx      Social History   Substance Use Topics    Smoking status: Never Smoker    Smokeless tobacco: Never Used    Alcohol use No     Review of Systems   Constitutional: Negative.    HENT: Negative.    Eyes: Negative.     Respiratory: Negative.    Cardiovascular: Negative.    Gastrointestinal: Negative.    Endocrine: Negative.    Genitourinary: Negative.    Musculoskeletal: Negative.    Skin: Negative.    Allergic/Immunologic: Negative.    Neurological: Negative.    Hematological: Negative.    Psychiatric/Behavioral: Negative.    All other systems reviewed and are negative.      Physical Exam   Initial Vitals   BP Pulse Resp Temp SpO2   03/17/17 2001 03/17/17 2001 03/17/17 2001 03/17/17 2001 03/17/17 2001   155/70 89 18 96.2 °F (35.7 °C) 100 %     Physical Exam    Nursing note and vitals reviewed.  Constitutional: Vital signs are normal. She appears well-developed. She is active and cooperative.   HENT:   Head: Normocephalic and atraumatic.   Eyes: Conjunctivae, EOM and lids are normal. Pupils are equal, round, and reactive to light.   Neck: Trachea normal and full passive range of motion without pain. Neck supple. No thyroid mass present.   Cardiovascular: Normal rate, regular rhythm, S1 normal, S2 normal, normal heart sounds, intact distal pulses and normal pulses.   Abdominal: Soft. Normal appearance, normal aorta and bowel sounds are normal.   Musculoskeletal: Normal range of motion.        Feet:    Lymphadenopathy:     She has no axillary adenopathy.   Neurological: She is alert and oriented to person, place, and time.   Skin: Skin is warm, dry and intact.   Psychiatric: She has a normal mood and affect. Her speech is normal and behavior is normal. Judgment and thought content normal. Cognition and memory are normal.         ED Course   Procedures  Labs Reviewed   POCT GLUCOSE - Abnormal; Notable for the following:        Result Value    POCT Glucose 391 (*)     All other components within normal limits   HEMOGLOBIN A1C   POCT GLUCOSE MONITORING CONTINUOUS   POCT CMP             Medical Decision Making:   ED Management:  The patient is also noted to have an elevated CBG.  He receive IV fluids in the department as well as IV  insulin to get the blood sugar down less than 250.  She is also receive clindamycin 150 mg IM and 50 mg of Ultram for pain.                   ED Course     Clinical Impression:   The encounter diagnosis was Wound cellulitis.          Hany Abraham MD  03/17/17 0552

## 2017-03-23 ENCOUNTER — HOSPITAL ENCOUNTER (OUTPATIENT)
Dept: RADIOLOGY | Facility: HOSPITAL | Age: 82
Discharge: HOME | End: 2017-03-23
Attending: PODIATRIST
Payer: MEDICARE

## 2017-03-23 ENCOUNTER — TELEPHONE (OUTPATIENT)
Dept: PODIATRY | Facility: CLINIC | Age: 82
End: 2017-03-23

## 2017-03-23 ENCOUNTER — OFFICE VISIT (OUTPATIENT)
Dept: PODIATRY | Facility: CLINIC | Age: 82
End: 2017-03-23
Payer: MEDICARE

## 2017-03-23 VITALS
HEIGHT: 59 IN | BODY MASS INDEX: 32.25 KG/M2 | DIASTOLIC BLOOD PRESSURE: 80 MMHG | SYSTOLIC BLOOD PRESSURE: 136 MMHG | WEIGHT: 160 LBS

## 2017-03-23 DIAGNOSIS — E11.49 TYPE II DIABETES MELLITUS WITH NEUROLOGICAL MANIFESTATIONS: Primary | ICD-10-CM

## 2017-03-23 DIAGNOSIS — E08.621 DIABETIC ULCER OF RIGHT FOOT ASSOCIATED WITH DIABETES MELLITUS DUE TO UNDERLYING CONDITION: ICD-10-CM

## 2017-03-23 DIAGNOSIS — S92.501A FRACTURE OF FIFTH TOE, RIGHT, CLOSED, INITIAL ENCOUNTER: ICD-10-CM

## 2017-03-23 DIAGNOSIS — L97.519 DIABETIC ULCER OF RIGHT FOOT ASSOCIATED WITH DIABETES MELLITUS DUE TO UNDERLYING CONDITION: ICD-10-CM

## 2017-03-23 DIAGNOSIS — M79.674 TOE PAIN, RIGHT: ICD-10-CM

## 2017-03-23 PROCEDURE — 3079F DIAST BP 80-89 MM HG: CPT | Mod: S$GLB,,, | Performed by: PODIATRIST

## 2017-03-23 PROCEDURE — 99999 PR PBB SHADOW E&M-EST. PATIENT-LVL III: CPT | Mod: PBBFAC,,, | Performed by: PODIATRIST

## 2017-03-23 PROCEDURE — 1126F AMNT PAIN NOTED NONE PRSNT: CPT | Mod: S$GLB,,, | Performed by: PODIATRIST

## 2017-03-23 PROCEDURE — 1159F MED LIST DOCD IN RCRD: CPT | Mod: S$GLB,,, | Performed by: PODIATRIST

## 2017-03-23 PROCEDURE — 73630 X-RAY EXAM OF FOOT: CPT | Mod: 26,RT,, | Performed by: RADIOLOGY

## 2017-03-23 PROCEDURE — 3075F SYST BP GE 130 - 139MM HG: CPT | Mod: S$GLB,,, | Performed by: PODIATRIST

## 2017-03-23 PROCEDURE — 99214 OFFICE O/P EST MOD 30 MIN: CPT | Mod: S$GLB,,, | Performed by: PODIATRIST

## 2017-03-23 PROCEDURE — 1160F RVW MEDS BY RX/DR IN RCRD: CPT | Mod: S$GLB,,, | Performed by: PODIATRIST

## 2017-03-23 PROCEDURE — 1157F ADVNC CARE PLAN IN RCRD: CPT | Mod: S$GLB,,, | Performed by: PODIATRIST

## 2017-03-23 RX ORDER — TOBRAMYCIN 3 MG/ML
1 SOLUTION/ DROPS OPHTHALMIC ONCE
Qty: 1 DROP | Refills: 0 | Status: SHIPPED | OUTPATIENT
Start: 2017-03-23 | End: 2017-03-23

## 2017-03-23 NOTE — MR AVS SNAPSHOT
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 Apply to wound bed - Right Eye    Pharmacy: [facility] [place]  [place], [address] Ph #: [phone]         [facility] On [facility]     [facility] On [facility] Nurse Care Line -  Assistance  Registered nurses in the [facility] Call Center provide clinical advisement, health education, appointment booking, and other advisory services.  Call for this free service at [phone].             Medications           Message regarding Medications     Verify the changes and/or additions to your medication regime listed below are the same as discussed with your clinician today.  If any of these changes or additions are incorrect, please notify your healthcare provider.        START taking these NEW medications        Refills    tobramycin sulfate 0.3% (TOBREX) 0.3 % ophthalmic solution 0    Sig: Place 1 drop into the right eye once. Apply to wound bed    Class: Normal    Route: Right Eye      STOP taking these medications     azithromycin (Z-TREVA) 250 MG tablet Take 2 tablets by mouth on day 1; Take 1 tablet by mouth on days 2-5           Verify that the below list of medications is an accurate representation of the medications you are  "currently taking.  If none reported, the list may be blank. If incorrect, please contact your healthcare provider. Carry this list with you in case of emergency.           Current Medications     ACCU-CHEK FASTCLIX Misc     alendronate (FOSAMAX) 70 MG tablet     blood sugar diagnostic (ACCU-CHEK SMARTVIEW TEST STRIP) Strp use with insulin AS DIRECTED as directed    blood sugar diagnostic Strp test AS DIRECTED    blood sugar diagnostic Strp test TWO TO THREE TIMES DAILY    blood-glucose meter (ACCU-CHEK DORCAS) Misc Please provide glucometer covered by the insurance company    clindamycin (CLEOCIN) 150 MG capsule Take 2 capsules (300 mg total) by mouth every 6 (six) hours.    desonide 0.05% (DESOWEN) 0.05 % Oint VICTOR HUGO AA BID RASH    dextromethorphan-guaifenesin  mg (MUCINEX DM)  mg per 12 hr tablet Take 1 tablet by mouth every 12 (twelve) hours.    diazepam (VALIUM) 2 MG tablet Take 1 tablet (2 mg total) by mouth daily as needed for Anxiety.    EASY TOUCH TWIST LANCETS 32 gauge Misc test DAILY AS DIRECTED    fluconazole (DIFLUCAN) 200 MG Tab Take 1 tablet (200 mg total) by mouth once daily.    fluocinonide (LIDEX) 0.05 % ointment 1 application 2 (two) times daily. Apply to affected area    FREESTYLE LANCETS 28 gauge lancets USE DAILY    gabapentin (NEURONTIN) 100 MG capsule Take 100 mg by mouth 3 (three) times daily.    hydrocodone-acetaminophen 5-325mg (NORCO) 5-325 mg per tablet Take 1 tablet by mouth every 12 (twelve) hours as needed for Pain. Severe pain    insulin needles, disposable, (NOVOFINE 32) 32 x 1/4 " Ndle Inject 1 Units into the skin once daily.    lancets (FREESTYLE LANCETS) 28 gauge Misc Inject 1 lancet into the skin once daily.    LANTUS SOLOSTAR 100 unit/mL (3 mL) InPn pen inject 32 UNITS IN THE MORNING    latanoprost (XALATAN) 0.005 % ophthalmic solution Place 1 drop into both eyes every evening.      metoprolol tartrate (LOPRESSOR) 25 MG tablet Take 1 tablet (25 mg total) by mouth 2 (two) " "times daily.    miconazole (MICOTIN) 2 % cream Apply topically 2 (two) times daily.    naproxen (NAPROSYN) 500 MG tablet Take 1 tablet (500 mg total) by mouth 2 (two) times daily with meals.    nitrofurantoin (MACRODANTIN) 100 MG capsule Take 100 mg by mouth 4 (four) times daily.    NOVOFINE 32 32 gauge x 1/4" Ndle Inject 1 Units into the skin once daily.    NOVOFINE 32 32 gauge x 1/4" Ndle Inject 1 Units into the skin once daily.    polyethylene glycol (GLYCOLAX) 17 gram/dose powder     simvastatin (ZOCOR) 40 MG tablet TAKE ONE TABLET BY MOUTH IN THE EVENING    tizanidine 2 mg Cap Take by mouth.    tramadol (ULTRAM) 50 mg tablet Take 1 tablet (50 mg total) by mouth every 4 (four) hours as needed for Pain.    triamcinolone acetonide 0.1% (KENALOG) 0.1 % ointment     amlodipine (NORVASC) 10 MG tablet Take 1 tablet (10 mg total) by mouth once daily.    escitalopram oxalate (LEXAPRO) 10 MG tablet Take 1 tablet (10 mg total) by mouth once daily.    lisinopril (PRINIVIL,ZESTRIL) 40 MG tablet Take 1 tablet (40 mg total) by mouth once daily.    tobramycin sulfate 0.3% (TOBREX) 0.3 % ophthalmic solution Place 1 drop into the right eye once. Apply to wound bed           Clinical Reference Information           Your Vitals Were     BP Height Weight BMI       136/80 4' 11" (1.499 m) 72.6 kg (160 lb) 32.32 kg/m2       Blood Pressure          Most Recent Value    BP  136/80      Allergies as of 3/23/2017     Bactrim [Sulfamethoxazole-trimethoprim]    Cephalexin    Codeine    Demerol [Meperidine]    Etodolac    Metformin    Naproxen    Sulfur    Terbinafine    Darvocet A500 [Propoxyphene N-acetaminophen]      Immunizations Administered on Date of Encounter - 3/23/2017     None      Orders Placed During Today's Visit      Normal Orders This Visit    Ambulatory referral to Home Health     Future Labs/Procedures Expected by Expires    US Ankle Brachial Indices Ext LTD WO Str  3/23/2017 3/23/2018      [facility] Sign-Up     Activating " your [facility] account is as easy as 1-2-3!     1) Visit [email or web address], select Sign Up Now, enter this activation code and your date of birth, then select Next.  [email or web address]  Expires: 4/22/2017 12:54 AM      2) Create a username and password to use when you visit [facility] in the future and select a security question in case you lose your password and select Next.    3) Enter your e-mail address and click Sign Up!    Additional Information  If you have questions, please e-mail [email or web address] or call [phone] to talk to our [facility] staff. Remember, [facility] is NOT to be used for urgent needs. For medical emergencies, dial 911.         Instructions    Please keep football dressing clean, dry, and intact until nurse comes.    If dressing gets wet please contact our office.    Wear special shoe every time foot is placed on the floor.    Elevate affected foot as much as possible    Stay hydrated.      Nutrition and MyPlate: Protein Foods  This group includes foods that are high in protein. Protein helps the body build new cells and keeps tissues healthy. Most Americans get enough protein without even trying. It can be harder for vegetarians, but plenty of non-meat foods are rich in protein, too. Its best to get protein from a variety of sources.    Nutrient-Rich Choices  Theres a lot more to this food group than just meat and beans. It also includes nuts, seeds, and eggs. There are all sorts of nutrient-rich choices:  · Chicken and turkey with the skin removed  · Fish and shellfish  · Lean beef, pork, or lamb (without visible fat)  · Soy products, such as tofu, soybeans (edamame), tempeh, or soymilk  · Black beans, kidney beans, boyle beans, chickpeas (garbanzo beans), and lentils (Note: beans and peas count as both a protein and a vegetable)  · Peanuts, almonds, walnuts, sesame seeds, and sunflower  seeds, as well as foods made from these (such as peanut butter or tahini)  · Eggs and foods made  with eggs (such as quiche or frittata)  What Makes Meat and Beans Less Healthy?  · Fatty meat is not healthy. Before you cook meat, trim off all the fat you can see. Chicken and turkey skin is also high in fat, and should be removed before cooking.  · Breading and frying make food less healthy. This includes dishes like fried chicken, fried fish, and refried beans.  · Sausage and lunch meats tend to be high in fat and salt. Buy low-fat, low-sodium versions.  One Small Change  Make a meal that includes a non-meat source of protein (such as tofu, lentils, or any other food listed above). Have a better idea? Write it here:  _____________________________________________________________  © 9926-4157 [employer]. [address] [place] [address]. All rights reserved. This information is not intended as a substitute for professional medical care. Always follow your healthcare professional's instructions.               Language Assistance Services     ATTENTION: Language assistance services are available, free of charge. Please call [phone].      ATENCIÓN: Si mamta parmar, tiene a graves disposición servicios gratuitos de asistencia lingüística. Llame al [phone].     ADAM Ý: N?u b?n nói Ti?ng Vi?t, có các d?ch v? h? tr? ngôn ng? mi?n phí dành cho b?n. G?i s? [phone].         [facility] complies with applicable Federal civil rights laws and does not discriminate on the basis of race, color, national origin, age, disability, or sex.

## 2017-03-23 NOTE — TELEPHONE ENCOUNTER
----- Message from Tori Stovall sent at 3/23/2017  3:53 PM CDT -----  Contact: [facility] calling to verify script of tobramycin sulfate 0.3% (TOBREX) 0.3 % ophthalmic solution. Please call [phone]

## 2017-03-23 NOTE — PATIENT INSTRUCTIONS
Please keep football dressing clean, dry, and intact until nurse comes.    If dressing gets wet please contact our office.    Wear special shoe every time foot is placed on the floor.    Elevate affected foot as much as possible    Stay hydrated.      Nutrition and MyPlate: Protein Foods  This group includes foods that are high in protein. Protein helps the body build new cells and keeps tissues healthy. Most Americans get enough protein without even trying. It can be harder for vegetarians, but plenty of non-meat foods are rich in protein, too. Its best to get protein from a variety of sources.    Nutrient-Rich Choices  Theres a lot more to this food group than just meat and beans. It also includes nuts, seeds, and eggs. There are all sorts of nutrient-rich choices:  · Chicken and turkey with the skin removed  · Fish and shellfish  · Lean beef, pork, or lamb (without visible fat)  · Soy products, such as tofu, soybeans (edamame), tempeh, or soymilk  · Black beans, kidney beans, boyle beans, chickpeas (garbanzo beans), and lentils (Note: beans and peas count as both a protein and a vegetable)  · Peanuts, almonds, walnuts, sesame seeds, and sunflower  seeds, as well as foods made from these (such as peanut butter or tahini)  · Eggs and foods made with eggs (such as quiche or frittata)  What Makes Meat and Beans Less Healthy?  · Fatty meat is not healthy. Before you cook meat, trim off all the fat you can see. Chicken and turkey skin is also high in fat, and should be removed before cooking.  · Breading and frying make food less healthy. This includes dishes like fried chicken, fried fish, and refried beans.  · Sausage and lunch meats tend to be high in fat and salt. Buy low-fat, low-sodium versions.  One Small Change  Make a meal that includes a non-meat source of protein (such as tofu, lentils, or any other food listed above). Have a better idea? Write it  here:  _____________________________________________________________  © 2000-2015 [employer]. [address] [place] [id number]. All rights reserved. This information is not intended as a substitute for professional medical care. Always follow your healthcare professional's instructions.

## 2017-03-23 NOTE — PROGRESS NOTES
Subjective:      Patient ID: Ngoc Henderson is a 85 y.o. female.    Chief Complaint: Foot Problem (right ft great toe Pcp Dr. Saleh  06/24/2016)    Ngoc is a 85 y.o. female who presents to the clinic for evaluation and treatment of high risk feet. Ngoc has a past medical history of Cataract; Diabetes mellitus type II; Glaucoma; Hyperlipidemia; Hypertension; and Osteoporosis. The patient's chief complaint is pain to the right great toe secondary to dropping hand soap on the toe ~ one month ago.  She has not been doing anything for self treatment other than wearing a darco shoe. Patient has history of 5th toe fracture on same foot.  She is concerned about fracture or infection to the hallux.  This patient has documented high risk feet requiring routine maintenance secondary to diabetes mellitis and those secondary complications of diabetes, as mentioned..    PCP: Hector Saleh MD    Date Last Seen by PCP:   Chief Complaint   Patient presents with    Foot Problem     right ft great toe Pcp Dr. Saleh  06/24/2016       Current shoe gear:  darco shoe right and slipper to left foot    Hemoglobin A1C   Date Value Ref Range Status   02/29/2016 7.6 (A)  Final   08/05/2015 8.3 (H) 4.5 - 6.2 % Final   02/12/2015 8.9 (H) 4.5 - 6.2 % Final   08/11/2014 8.0 (H) 4.5 - 6.2 % Final       Patient Active Problem List   Diagnosis    Diabetes mellitus, type II    Hyperlipidemia    Hypertension    Osteoporosis, postmenopausal    Anxiety    Epidermal inclusion cyst    Skin lesion    Right foot pain    Urinary retention    Acute right hip pain    Right-sided low back pain with right-sided sciatica    SOB (shortness of breath)    Elevated d-dimer       Current Outpatient Prescriptions on File Prior to Visit   Medication Sig Dispense Refill    ACCU-CHEK FASTCLIX Misc   5    alendronate (FOSAMAX) 70 MG tablet   6    blood sugar diagnostic (ACCU-CHEK SMARTVIEW TEST STRIP) Strp use with insulin AS DIRECTED as directed  "100 strip 7    blood sugar diagnostic Strp test AS DIRECTED  0    blood sugar diagnostic Strp test TWO TO THREE TIMES DAILY  0    blood-glucose meter (ACCU-CHEK DORCAS) Misc Please provide glucometer covered by the insurance company 1 each 1    clindamycin (CLEOCIN) 150 MG capsule Take 2 capsules (300 mg total) by mouth every 6 (six) hours. 56 capsule 0    desonide 0.05% (DESOWEN) 0.05 % Oint VICTOR HUGO AA BID RASH  1    dextromethorphan-guaifenesin  mg (MUCINEX DM)  mg per 12 hr tablet Take 1 tablet by mouth every 12 (twelve) hours.      diazepam (VALIUM) 2 MG tablet Take 1 tablet (2 mg total) by mouth daily as needed for Anxiety. 30 tablet 0    EASY TOUCH TWIST LANCETS 32 gauge Misc test DAILY AS DIRECTED  7    fluconazole (DIFLUCAN) 200 MG Tab Take 1 tablet (200 mg total) by mouth once daily. 2 tablet 0    fluocinonide (LIDEX) 0.05 % ointment 1 application 2 (two) times daily. Apply to affected area  0    FREESTYLE LANCETS 28 gauge lancets USE DAILY 100 each 5    gabapentin (NEURONTIN) 100 MG capsule Take 100 mg by mouth 3 (three) times daily.      hydrocodone-acetaminophen 5-325mg (NORCO) 5-325 mg per tablet Take 1 tablet by mouth every 12 (twelve) hours as needed for Pain. Severe pain 5 tablet 0    insulin needles, disposable, (NOVOFINE 32) 32 x 1/4 " Ndle Inject 1 Units into the skin once daily. 100 each 1    lancets (FREESTYLE LANCETS) 28 gauge Misc Inject 1 lancet into the skin once daily. 100 each 1    LANTUS SOLOSTAR 100 unit/mL (3 mL) InPn pen inject 32 UNITS IN THE MORNING 9 mL 11    latanoprost (XALATAN) 0.005 % ophthalmic solution Place 1 drop into both eyes every evening.        metoprolol tartrate (LOPRESSOR) 25 MG tablet Take 1 tablet (25 mg total) by mouth 2 (two) times daily. 60 tablet 11    miconazole (MICOTIN) 2 % cream Apply topically 2 (two) times daily. 30 g 0    naproxen (NAPROSYN) 500 MG tablet Take 1 tablet (500 mg total) by mouth 2 (two) times daily with meals. 60 " "tablet 2    nitrofurantoin (MACRODANTIN) 100 MG capsule Take 100 mg by mouth 4 (four) times daily.      NOVOFINE 32 32 gauge x 1/4" Ndle Inject 1 Units into the skin once daily. 100 each 0    NOVOFINE 32 32 gauge x 1/4" Ndle Inject 1 Units into the skin once daily. 100 each 1    polyethylene glycol (GLYCOLAX) 17 gram/dose powder   0    simvastatin (ZOCOR) 40 MG tablet TAKE ONE TABLET BY MOUTH IN THE EVENING 30 tablet 1    tizanidine 2 mg Cap Take by mouth.      tramadol (ULTRAM) 50 mg tablet Take 1 tablet (50 mg total) by mouth every 4 (four) hours as needed for Pain. 20 tablet 0    triamcinolone acetonide 0.1% (KENALOG) 0.1 % ointment   0    amlodipine (NORVASC) 10 MG tablet Take 1 tablet (10 mg total) by mouth once daily. 30 tablet 11    escitalopram oxalate (LEXAPRO) 10 MG tablet Take 1 tablet (10 mg total) by mouth once daily. 30 tablet 2    lisinopril (PRINIVIL,ZESTRIL) 40 MG tablet Take 1 tablet (40 mg total) by mouth once daily. 90 tablet 3     No current facility-administered medications on file prior to visit.        Review of patient's allergies indicates:   Allergen Reactions    Bactrim [sulfamethoxazole-trimethoprim]     Cephalexin Other (See Comments)     Leg cramps    Codeine Itching    Demerol [meperidine]     Etodolac Diarrhea    Metformin Diarrhea    Naproxen     Sulfur     Terbinafine     Darvocet a500 [propoxyphene n-acetaminophen] Rash       Past Surgical History:   Procedure Laterality Date    CHOLECYSTECTOMY      excision left axillary mass      excision thigh masses      EYE SURGERY      HEMORRHOID SURGERY      HYSTERECTOMY      VULVECTOMY         Family History   Problem Relation Age of Onset    Diabetes Mother     Diabetes Son     Breast cancer Neg Hx     Colon cancer Neg Hx     Ovarian cancer Neg Hx        Social History     Social History    Marital status:      Spouse name: N/A    Number of children: N/A    Years of education: N/A " "    Occupational History    Not on file.     Social History Main Topics    Smoking status: Never Smoker    Smokeless tobacco: Never Used    Alcohol use No    Drug use: No    Sexual activity: Not Currently     Other Topics Concern    Not on file     Social History Narrative       Review of Systems   Constitution: Negative for chills, fever and weakness.   Cardiovascular: Positive for leg swelling (RLE). Negative for chest pain and claudication.        Varicosities   Respiratory: Negative for cough and shortness of breath.    Skin: Negative.  Negative for itching and rash.   Musculoskeletal: Positive for arthritis, back pain, joint pain and myalgias. Negative for falls, joint swelling and muscle weakness.        Toe swelling/pain   Gastrointestinal: Negative for diarrhea, nausea and vomiting.   Neurological: Positive for numbness and paresthesias. Negative for tremors.   Psychiatric/Behavioral: Negative for altered mental status and hallucinations.           Objective:       Vitals:    03/23/17 1406   BP: 136/80   Weight: 72.6 kg (160 lb)   Height: 4' 11" (1.499 m)   PainSc: 0-No pain       Physical Exam   Constitutional:  Non-toxic appearance. She does not have a sickly appearance. No distress.   Pt. is well-developed, well-nourished, appears stated age, in no acute distress, alert and oriented x 3. No evidence of depression, anxiety, or agitation. Calm, cooperative, and communicative. Appropriate interactions and affect.   Cardiovascular:   Pulses:       Dorsalis pedis pulses are 1+ on the right side, and 1+ on the left side.        Posterior tibial pulses are 1+ on the right side, and 1+ on the left side.    Dorsalis pedis and posterior tibial pulses are diminished Bilaterally. Toes are cool to touch. Feet are warm proximally.There is decreased digital hair. Skin is atrophic,mildly edematous     +varicosities   Pulmonary/Chest: No respiratory distress.   Musculoskeletal:        Right ankle: No tenderness. No " lateral malleolus, no medial malleolus, no AITFL, no CF ligament and no posterior TFL tenderness found. Achilles tendon exhibits no pain, no defect and normal Sotelo's test results.        Left ankle: No tenderness. No lateral malleolus, no medial malleolus, no AITFL, no CF ligament and no posterior TFL tenderness found. Achilles tendon exhibits no pain, no defect and normal Sotelo's test results.        Right foot: There is tenderness (proximal hallux nail fold) and swelling. There is no bony tenderness.        Left foot: There is no tenderness and no bony tenderness.    Decreased stride, station of gait.  apropulsive toe off.  Increased angle and base of gait.      Patient has hammertoes of digits 2-5 bilateral partially reducible without symptom today.     Visible and palpable bunion without pain at dorsomedial 1st metatarsal head right and left.  Hallux abducted right and left partially reducible, tracks laterally without being track bound.  No ecchymosis, erythema, edema, or cardinal signs infection or signs of trauma same foot.     Fat pad atrophy to heels and met heads bilateral     Lymphadenopathy:   No lymphatic streaking    Negative lymphadenopathy bilateral popliteal fossa and tarsal tunnel.     Neurological:   [place]-Poly 5.07 monofilament is intact bilateral feet. Sharp/dull sensation is also intact Bilateral feet. Proprioception is grossly intact. Vibratory sensation intact (pt able to sense vibration stop within 3-5 seconds)     Skin: Skin is warm and dry. Lesion noted. No abrasion, no bruising, no burn, no ecchymosis and no rash noted. She is not diaphoretic. There is erythema. No cyanosis. No pallor. Nails show no clubbing.   Toenails x6  bilaterally are dystrophic but well trimmed.     Ulcer location: proximal nail fold of the right hallux  Measurements: 0.2x0.4x0.2cm  Signs of infection: tenderness, local edema and erythema  Drainage: serous  Periwound: intact  Base: fibrin                Psychiatric: Her mood appears not anxious. Her affect is not inappropriate. Her speech is not slurred. She is not combative. She is communicative. She is attentive.   Nursing note reviewed.                    Assessment:       Encounter Diagnoses   Name Primary?    Type II diabetes mellitus with neurological manifestations Yes    Diabetic ulcer of right foot associated with diabetes mellitus due to underlying condition          Plan:       Ngoc was seen today for foot problem.    Diagnoses and all orders for this visit:    Type II diabetes mellitus with neurological manifestations  -     Ambulatory referral to Home Health  -     US Ankle Brachial Indices Ext LTD WO Str; Future    Diabetic ulcer of right foot associated with diabetes mellitus due to underlying condition  -     Ambulatory referral to Home Health  -     US Ankle Brachial Indices Ext LTD WO Str; Future    Other orders  -     tobramycin sulfate 0.3% (TOBREX) 0.3 % ophthalmic solution; Place 1 drop into the right eye once. Apply to wound bed      I counseled the patient on her conditions, their implications and medical management.      Greater than 50% of this visit spent on counseling and coordination of care.    Greater than 10 minutes spent discussing wound healing cycle, healing, infection control, risk of bone infection. Adequate vitamin supplementation, protein intake, and hydration - discussed with patient    No fracture seen on x-ray.  Reviewed imaging with patient.    Continue cleocin she was placed on in ED    I am concerned about vascularity, COLT ordered.    The wound is cleansed of foreign material as much as possible, and dressed. The patient is alerted to watch for any signs of infection (redness, pus, pain, increased swelling or fever) and call if such occurs. Home health wound care orders for tobrex and DSD    Dressings: iodosorb and football  Offloading:darco shoe    Follow-up:Patient is to return to the clinic in 2 weeks for  follow-up but [facility] if any signs of infection, such as fever, chills, sweats, increased redness or pain.    Short-term goals include maintaining good offloading and minimizing bioburden, promoting granulation and epithelialization to healing.  Long-term goals include keeping the wound healed by good offloading and medical management under the direction of internist.

## 2017-03-24 ENCOUNTER — TELEPHONE (OUTPATIENT)
Dept: PODIATRY | Facility: CLINIC | Age: 82
End: 2017-03-24

## 2017-03-24 NOTE — TELEPHONE ENCOUNTER
Pt has not tried anything over the counter for pain.I stated to her what medication are considered over the counter pain medications. I stated to the patient that I put a message in for Dr. Lugo about the pain she is having and that she is not here on fridays so when I get a response I'll call her back. Patient did not seem to understand so I asked to speak with someone else. I explained everything to her and her son numerous times before ending call.

## 2017-03-25 ENCOUNTER — HOSPITAL ENCOUNTER (EMERGENCY)
Facility: OTHER | Age: 82
Discharge: HOME | End: 2017-03-25
Attending: EMERGENCY MEDICINE
Payer: MEDICARE

## 2017-03-25 VITALS
HEIGHT: 59 IN | DIASTOLIC BLOOD PRESSURE: 69 MMHG | TEMPERATURE: 98 F | WEIGHT: 165 LBS | HEART RATE: 85 BPM | BODY MASS INDEX: 33.26 KG/M2 | RESPIRATION RATE: 17 BRPM | OXYGEN SATURATION: 96 % | SYSTOLIC BLOOD PRESSURE: 177 MMHG

## 2017-03-25 DIAGNOSIS — R73.9 HYPERGLYCEMIA: Primary | ICD-10-CM

## 2017-03-25 DIAGNOSIS — B37.2 CANDIDAL INTERTRIGO: ICD-10-CM

## 2017-03-25 LAB
ALBUMIN SERPL-MCNC: 3.5 G/DL (ref 3.3–5.5)
ALP SERPL-CCNC: 96 U/L (ref 42–141)
BILIRUB SERPL-MCNC: 0.5 MG/DL (ref 0.2–1.6)
BILIRUB SERPL-MCNC: NEGATIVE MG/DL
BLOOD, POC UA: NEGATIVE
BUN SERPL-MCNC: 8 MG/DL (ref 7–22)
CALCIUM SERPL-MCNC: 9.6 MG/DL (ref 8–10.3)
CHLORIDE SERPL-SCNC: 89 MMOL/L (ref 98–108)
CLARITY, POC UA: CLEAR
COLOR, POC UA: YELLOW
CREAT SERPL-MCNC: 0.8 MG/DL (ref 0.6–1.2)
GLUCOSE SERPL-MCNC: 326 MG/DL (ref 73–118)
GLUCOSE SERPL-MCNC: 500 MG/DL (ref 70–110)
HCO3 UR-SCNC: 32.3 MMOL/L (ref 24–28)
LDH SERPL L TO P-CCNC: 1.39 MMOL/L (ref 0.5–2.2)
LEUKOCYTE EST, POC UA: NEGATIVE
NITRITE, POC UA: NEGATIVE
PCO2 BLDA: 61.9 MMHG (ref 35–45)
PH SMN: 6.5 [PH]
PH SMN: 7.33 [PH] (ref 7.35–7.45)
PO2 BLDA: 140 MMHG (ref 40–60)
POC ALT (SGPT): 18 U/L (ref 10–47)
POC AST (SGOT): 25 U/L (ref 11–38)
POC BE: 6 MMOL/L
POC KETONES, BLOOD: NEGATIVE
POC SATURATED O2: 99 % (ref 95–100)
POC TCO2: 27 MMOL/L (ref 18–33)
POC TCO2: 34 MMOL/L (ref 24–29)
POCT GLUCOSE: 288 MG/DL (ref 70–110)
POCT GLUCOSE: 372 MG/DL (ref 70–110)
POTASSIUM BLD-SCNC: 4.5 MMOL/L (ref 3.6–5.1)
PROTEIN, POC: 7.4 G/DL (ref 6.4–8.1)
PROTEIN, POC: NEGATIVE
SAMPLE: ABNORMAL
SITE: ABNORMAL
SODIUM BLD-SCNC: 137 MMOL/L (ref 128–145)
SPECIFIC GRAVITY, POC UA: 1.01
UROBILINOGEN, POC UA: 0.2 E.U./DL

## 2017-03-25 PROCEDURE — 83605 ASSAY OF LACTIC ACID: CPT

## 2017-03-25 PROCEDURE — 80053 COMPREHEN METABOLIC PANEL: CPT

## 2017-03-25 PROCEDURE — 81001 URINALYSIS AUTO W/SCOPE: CPT

## 2017-03-25 PROCEDURE — 25000003 PHARM REV CODE 250: Performed by: EMERGENCY MEDICINE

## 2017-03-25 PROCEDURE — 96360 HYDRATION IV INFUSION INIT: CPT

## 2017-03-25 PROCEDURE — 85025 COMPLETE CBC W/AUTO DIFF WBC: CPT

## 2017-03-25 PROCEDURE — 82947 ASSAY GLUCOSE BLOOD QUANT: CPT

## 2017-03-25 PROCEDURE — 99283 EMERGENCY DEPT VISIT LOW MDM: CPT | Mod: 25

## 2017-03-25 RX ORDER — NYSTATIN 100000 U/G
OINTMENT TOPICAL 2 TIMES DAILY
Qty: 15 G | Refills: 0 | Status: ON HOLD | OUTPATIENT
Start: 2017-03-25 | End: 2018-02-09 | Stop reason: HOSPADM

## 2017-03-25 RX ORDER — SODIUM CHLORIDE 9 MG/ML
1000 INJECTION, SOLUTION INTRAVENOUS ONCE
Status: COMPLETED | OUTPATIENT
Start: 2017-03-25 | End: 2017-03-25

## 2017-03-25 RX ADMIN — SODIUM CHLORIDE 1000 ML: 0.9 INJECTION, SOLUTION INTRAVENOUS at 08:03

## 2017-03-25 NOTE — ED AVS SNAPSHOT
[facility] EMERGENCY DEPARTMENT  [address]  [place] [address]               Ngoc Henderson   3/25/2017  7:20 PM   ED    Description:  Female : 1931   Department:  [facility] Emergency Department           Your Care was Coordinated By:     Provider Role From To    Derik Reyna MD Attending Provider 17 1834 --      Reason for Visit     Hyperglycemia           Diagnoses this Visit        Comments    Hyperglycemia    -  Primary     Candidal intertrigo           ED Disposition     ED Disposition Condition Comment    Discharge  Patient discharged to home in stable condition.              To Do List           Follow-up Information     Follow up with Hector Saleh MD On 3/27/2017.    Specialties:  Internal Medicine, Oncology, Hematology and Oncology    Why:  for re-evaluation of today's complaint, and ongoing care    Contact information:    [address] [facility]  [address]  [place] [id number] [phone]         These Medications        Disp Refills Start End    nystatin (MYCOSTATIN) ointment 15 g 0 3/25/2017 2017    Apply topically 2 (two) times daily. - Topical (Top)      [facility]     [facility] Care [facility] -  Assistance  Registered nurses in the [facility] provide clinical advisement, health education, appointment booking, and other advisory services.  Call for this free service at [phone].             Medications           Message regarding Medications     Verify the changes and/or additions to your medication regime listed below are the same as discussed with your clinician today.  If any of these changes or additions are incorrect, please notify your healthcare provider.        START taking these NEW medications        Refills    nystatin (MYCOSTATIN) ointment 0    Sig: Apply topically 2 (two) times daily.    Class: Print    Route: Topical (Top)      These medications were administered today        Dose Freq    0.9%  NaCl  "infusion 1,000 mL Once    Sig: Inject 1,000 mLs into the vein once.    Class: Normal    Route: Intravenous           Verify that the below list of medications is an accurate representation of the medications you are currently taking.  If none reported, the list may be blank. If incorrect, please contact your healthcare provider. Carry this list with you in case of emergency.           Current Medications     ACCU-CHEK FASTCLIX Misc     alendronate (FOSAMAX) 70 MG tablet     amlodipine (NORVASC) 10 MG tablet Take 1 tablet (10 mg total) by mouth once daily.    blood sugar diagnostic (ACCU-CHEK SMARTVIEW TEST STRIP) Strp use with insulin AS DIRECTED as directed    blood sugar diagnostic Strp test AS DIRECTED    blood sugar diagnostic Strp test TWO TO THREE TIMES DAILY    blood-glucose meter (ACCU-CHEK DORCAS) Misc Please provide glucometer covered by the insurance company    clindamycin (CLEOCIN) 150 MG capsule Take 2 capsules (300 mg total) by mouth every 6 (six) hours.    desonide 0.05% (DESOWEN) 0.05 % Oint VICTOR HUGO AA BID RASH    dextromethorphan-guaifenesin  mg (MUCINEX DM)  mg per 12 hr tablet Take 1 tablet by mouth every 12 (twelve) hours.    diazepam (VALIUM) 2 MG tablet Take 1 tablet (2 mg total) by mouth daily as needed for Anxiety.    EASY TOUCH TWIST LANCETS 32 gauge Misc test DAILY AS DIRECTED    escitalopram oxalate (LEXAPRO) 10 MG tablet Take 1 tablet (10 mg total) by mouth once daily.    fluconazole (DIFLUCAN) 200 MG Tab Take 1 tablet (200 mg total) by mouth once daily.    fluocinonide (LIDEX) 0.05 % ointment 1 application 2 (two) times daily. Apply to affected area    FREESTYLE LANCETS 28 gauge lancets USE DAILY    gabapentin (NEURONTIN) 100 MG capsule Take 100 mg by mouth 3 (three) times daily.    hydrocodone-acetaminophen 5-325mg (NORCO) 5-325 mg per tablet Take 1 tablet by mouth every 12 (twelve) hours as needed for Pain. Severe pain    insulin needles, disposable, (NOVOFINE 32) 32 x 1/4 " Ndle " "Inject 1 Units into the skin once daily.    lancets (FREESTYLE LANCETS) 28 gauge Misc Inject 1 lancet into the skin once daily.    LANTUS SOLOSTAR 100 unit/mL (3 mL) InPn pen inject 32 UNITS IN THE MORNING    latanoprost (XALATAN) 0.005 % ophthalmic solution Place 1 drop into both eyes every evening.      lisinopril (PRINIVIL,ZESTRIL) 40 MG tablet Take 1 tablet (40 mg total) by mouth once daily.    metoprolol tartrate (LOPRESSOR) 25 MG tablet Take 1 tablet (25 mg total) by mouth 2 (two) times daily.    miconazole (MICOTIN) 2 % cream Apply topically 2 (two) times daily.    naproxen (NAPROSYN) 500 MG tablet Take 1 tablet (500 mg total) by mouth 2 (two) times daily with meals.    nitrofurantoin (MACRODANTIN) 100 MG capsule Take 100 mg by mouth 4 (four) times daily.    NOVOFINE 32 32 gauge x 1/4" Ndle Inject 1 Units into the skin once daily.    NOVOFINE 32 32 gauge x 1/4" Ndle Inject 1 Units into the skin once daily.    nystatin (MYCOSTATIN) ointment Apply topically 2 (two) times daily.    polyethylene glycol (GLYCOLAX) 17 gram/dose powder     simvastatin (ZOCOR) 40 MG tablet TAKE ONE TABLET BY MOUTH IN THE EVENING    tizanidine 2 mg Cap Take by mouth.    tramadol (ULTRAM) 50 mg tablet Take 1 tablet (50 mg total) by mouth every 4 (four) hours as needed for Pain.    triamcinolone acetonide 0.1% (KENALOG) 0.1 % ointment            Clinical Reference Information           Your Vitals Were     BP Pulse Temp Resp Height Weight    177/69 (BP Location: Left arm, Patient Position: Sitting) 85 97.8 °F (36.6 °C) (Temporal) 17 4' 11" (1.499 m) 74.8 kg (165 lb)    SpO2 BMI             96% 33.33 kg/m2         Allergies as of 3/25/2017        Reactions    Bactrim [Sulfamethoxazole-trimethoprim]     Cephalexin Other (See Comments)    Leg cramps    Codeine Itching    Demerol [Meperidine]     Etodolac Diarrhea    Metformin Diarrhea    Naproxen     Sulfur     Terbinafine     Darvocet A500 [Propoxyphene N-acetaminophen] Rash    "   Immunizations Administered on Date of Encounter - 3/25/2017     None      ED Micro, Lab, POCT     Start Ordered       Status Ordering Provider    03/25/17 2125 03/25/17 2125  POCT glucose  Once      Final result     03/25/17 2046 03/25/17 2046  ISTAT PROCEDURE  Once      Final result     03/25/17 2011 03/25/17 2011  POCT CMP  Once      Final result     03/25/17 1940 03/25/17 1940  POCT URINALYSIS W/O SCOPE  Once      Final result     03/25/17 1931 03/25/17 1930  POCT BMP  Once      Completed     03/25/17 1930 03/25/17 1930  POCT CBC  Once      Acknowledged     03/25/17 1929 03/25/17 1928  POCT URINALYSIS W/O SCOPE  Once      Completed     03/25/17 1916 03/25/17 1916  POCT glucose  Once      Final result     03/25/17 1914 03/25/17 1913  POCT glucose  Once      Acknowledged       ED Imaging Orders     None        Discharge Instructions         Candida Skin Infection (Adult)  Candida is type of yeast. It grows naturally on the skin and in the mouth. If it grows out of control, it can cause an infection. Candida can cause infections in the genital area, skin folds, in the mouth, and under the breasts. Anyone can get this infection. It is more common in a person with a weak immune system, such as from diabetes, HIV, or cancer. Its also more common in someone who has been on antibiotic therapy. And its more common people who are overweight or who have incontinence. Wearing tight-fitting clothing and taking part in activities with lots of skin-to-skin contact can also put you at risk.  Candida causes the skin to become bright red and inflamed. The border of the infected part of the skin is often raised. The infection causes pain and itching. Sometimes the skin peels and bleeds. In the mouth, candida is called thrush, and may cause white thickened areas.  A Candida rash is most often treated with an antifungal cream or ointment. The rash will clear a few days after starting the medicine. Infections that dont go away  may need a prescription medicine. In rare cases, a bacterial infection can also occur.  Home care  Your healthcare provider will recommend an antifungal cream or ointment for the rash. He or she may also prescribe a medicine for the itch. Follow all instructions for using these medicines. Dont use cornstarch powder. Cornstarch can cause the Candida infection to get worse.  General care:  · Keep your skin clean by washing the area twice a day.  · Use the cream as directed until your rash is gone. Once the skin has healed, keep it dry to prevent another infection.   · If you are overweight, talk with your healthcare provider about a plan to lose excess weight.  · Avoid clothes that fit tightly.  Follow-up care  Follow up with your healthcare provider, or as advised. Your rash will clear in 7 to 14 days. Call your healthcare provider if the rash is not gone after 14 days.  When to seek medical advice  Call your healthcare provider right away if any of these occur:  · Pain or redness that gets worse or spreads  · Fluid coming from the skin  · Yellow crusts on the skin  · Fever of 100.4°F (38°C) or higher, or as directed by your healthcare provider  Date Last Reviewed: 9/1/2016  © 4298-9616 [employer]. [address] [place] [address]. All rights reserved. This information is not intended as a substitute for professional medical care. Always follow your healthcare professional's instructions.          Your Scheduled Appointments     Apr 06, 2017  1:00 PM CDT   [facility] [place] with [facility] USVAS1   [facility] [address] [place] [id number] [phone]              [facility] Sign-Up     Activating your [facility] account is as easy as 1-2-3!     1) Visit [email or web address], select Sign Up Now, enter this activation code and your date of birth, then select Next.  [email or web address]  Expires: 4/22/2017 12:54 AM      2) Create a username and password to  use when you visit [facility] in the future and select a security question in case you lose your password and select Next.    3) Enter your e-mail address and click Sign Up!    Additional Information  If you have questions, please e-mail [email or web address] or call [phone] to talk to our [employer] staff. Remember, [facility] is NOT to be used for urgent needs. For medical emergencies, dial 911.          [facility] Emergency Department complies with applicable Federal civil rights laws and does not discriminate on the basis of race, color, national origin, age, disability, or sex.        Language Assistance Services     ATTENTION: Language assistance services are available, free of charge. Please call [phone].      ATENCIÓN: Si gailla ghulam, tiene a graves disposición servicios gratuitos de asistencia lingüística. Llame al [phone].     CHÚ Ý: N?u b?n nói Ti?ng Vi?t, có các d?ch v? h? tr? ngôn ng? mi?n phí dành cho b?n. G?i s? [phone].

## 2017-03-26 NOTE — DISCHARGE INSTRUCTIONS
Candida Skin Infection (Adult)  Candida is type of yeast. It grows naturally on the skin and in the mouth. If it grows out of control, it can cause an infection. Candida can cause infections in the genital area, skin folds, in the mouth, and under the breasts. Anyone can get this infection. It is more common in a person with a weak immune system, such as from diabetes, HIV, or cancer. Its also more common in someone who has been on antibiotic therapy. And its more common people who are overweight or who have incontinence. Wearing tight-fitting clothing and taking part in activities with lots of skin-to-skin contact can also put you at risk.  Candida causes the skin to become bright red and inflamed. The border of the infected part of the skin is often raised. The infection causes pain and itching. Sometimes the skin peels and bleeds. In the mouth, candida is called thrush, and may cause white thickened areas.  A Candida rash is most often treated with an antifungal cream or ointment. The rash will clear a few days after starting the medicine. Infections that dont go away may need a prescription medicine. In rare cases, a bacterial infection can also occur.  Home care  Your healthcare provider will recommend an antifungal cream or ointment for the rash. He or she may also prescribe a medicine for the itch. Follow all instructions for using these medicines. Dont use cornstarch powder. Cornstarch can cause the Candida infection to get worse.  General care:  · Keep your skin clean by washing the area twice a day.  · Use the cream as directed until your rash is gone. Once the skin has healed, keep it dry to prevent another infection.   · If you are overweight, talk with your healthcare provider about a plan to lose excess weight.  · Avoid clothes that fit tightly.  Follow-up care  Follow up with your healthcare provider, or as advised. Your rash will clear in 7 to 14 days. Call your healthcare provider if the rash  is not gone after 14 days.  When to seek medical advice  Call your healthcare provider right away if any of these occur:  · Pain or redness that gets worse or spreads  · Fluid coming from the skin  · Yellow crusts on the skin  · Fever of 100.4°F (38°C) or higher, or as directed by your healthcare provider  Date Last Reviewed: 9/1/2016  © 5818-5804 [employer]. [address] [place] [address], [place] [address]. All rights reserved. This information is not intended as a substitute for professional medical care. Always follow your healthcare professional's instructions.

## 2017-03-26 NOTE — ED PROVIDER NOTES
Encounter Date: 3/25/2017       History     Chief Complaint   Patient presents with    Hyperglycemia     Pt's cbg is running in 300s. Pt coming in to be evaluated. No other complaints     Review of patient's allergies indicates:   Allergen Reactions    Bactrim [sulfamethoxazole-trimethoprim]     Cephalexin Other (See Comments)     Leg cramps    Codeine Itching    Demerol [meperidine]     Etodolac Diarrhea    Metformin Diarrhea    Naproxen     Sulfur     Terbinafine     Darvocet a500 [propoxyphene n-acetaminophen] Rash     HPI   85 y.o. female with past medical history of DM presents to ED for evaluation of hyperglycemia. States she has been taking Lantus prescribed by her new PCP and blood glucose level has been elevated since then 200-400's.   Currently undergoing wound care for right great toe wound following blunt trauma.   Past Medical History:   Diagnosis Date    Cataract     Diabetes mellitus type II     Glaucoma     Hyperlipidemia     Hypertension     Osteoporosis      Past Surgical History:   Procedure Laterality Date    CHOLECYSTECTOMY      excision left axillary mass      excision thigh masses      EYE SURGERY      HEMORRHOID SURGERY      HYSTERECTOMY      VULVECTOMY       Family History   Problem Relation Age of Onset    Diabetes Mother     Diabetes Son     Breast cancer Neg Hx     Colon cancer Neg Hx     Ovarian cancer Neg Hx      Social History   Substance Use Topics    Smoking status: Never Smoker    Smokeless tobacco: Never Used    Alcohol use No     Review of Systems   Constitutional: Negative for appetite change, chills and fever.   HENT: Positive for congestion and postnasal drip.    Eyes: Negative.    Respiratory: Positive for cough. Negative for shortness of breath and stridor.    Cardiovascular: Negative for chest pain and palpitations.   Gastrointestinal: Positive for constipation (occasional, alleviated with OTC stool softners). Negative for nausea and vomiting.    Genitourinary: Positive for frequency. Negative for dysuria and hematuria.   Musculoskeletal: Negative.    Skin: Positive for rash (right inguinal crease).   Neurological: Negative for dizziness and headaches.   Psychiatric/Behavioral: Negative.    All other systems reviewed and are negative.      Physical Exam   Initial Vitals   BP Pulse Resp Temp SpO2   03/25/17 1911 03/25/17 1911 03/25/17 1911 03/25/17 1911 03/25/17 1911   177/69 85 17 97.8 °F (36.6 °C) 96 %     Physical Exam    Nursing note and vitals reviewed.  Constitutional: She appears well-developed and well-nourished. She is not diaphoretic. No distress.   HENT:   Head: Normocephalic and atraumatic.   Mouth/Throat: Oropharynx is clear and moist. No oropharyngeal exudate.   Eyes: EOM are normal. Pupils are equal, round, and reactive to light.   Neck: Normal range of motion. Neck supple.   Cardiovascular: Normal rate, regular rhythm and intact distal pulses.   No murmur heard.  Pulmonary/Chest: Breath sounds normal. No stridor. No respiratory distress.   Abdominal: Soft. Bowel sounds are normal. There is no tenderness.   Musculoskeletal: Normal range of motion. She exhibits no edema or tenderness.   Neurological: She is alert and oriented to person, place, and time. She has normal strength. No cranial nerve deficit.   Skin: Skin is warm and dry. Rash noted. Rash is macular. No erythema. No pallor.        Psychiatric: She has a normal mood and affect.         ED Course   Procedures  Labs Reviewed   POCT GLUCOSE - Abnormal; Notable for the following:        Result Value    POCT Glucose 372 (*)     All other components within normal limits   ISTAT PROCEDURE - Abnormal; Notable for the following:     POC PH 7.326 (*)     POC PCO2 61.9 (*)     POC PO2 140 (*)     POC HCO3 32.3 (*)     POC TCO2 34 (*)     All other components within normal limits   POCT GLUCOSE - Abnormal; Notable for the following:     POCT Glucose 288 (*)     All other components within normal  limits   POCT URINALYSIS W/O SCOPE   POCT URINALYSIS W/O SCOPE   POCT GLUCOSE   POCT CBC   POCT BMP   POCT CMP                               ED Course     Labs Reviewed  Admission on 03/25/2017   Component Date Value Ref Range Status    POCT Glucose 03/25/2017 372* 70 - 110 mg/dL Final    POC Glucose 03/25/2017 500  mg/dL Final    Bilirubin 03/25/2017 Negative   Final    Ketones, Blood POC 03/25/2017 Negative   Final    Spec Grav 03/25/2017 1.010   Final    Blood 03/25/2017 Negative   Final    POC PH 03/25/2017 6.5   Final    Protein 03/25/2017 Negative   Final    Urobilinogen 03/25/2017 0.2  E.U./dL Final    Nitrite 03/25/2017 Negative   Final    Leukocytes 03/25/2017 Negative   Final    Color 03/25/2017 Yellow   Final    Clarity 03/25/2017 Clear   Final    Albumin, POC 03/25/2017 3.5  3.3 - 5.5 g/dL Final    Alkaline Phosphatase, POC 03/25/2017 96  42 - 141 U/L Final    ALT (SGPT), POC 03/25/2017 18  10.0 - 47.0 U/L Final    AST (SGOT), POC 03/25/2017 25  11.0 - 38 U/L Final    POC BUN 03/25/2017 8  7.0 - 22.0 mg/dL Final    Calcium, POC 03/25/2017 9.6  8.0 - 10.3 mg/dL Final    POC Chloride 03/25/2017 89  98 - 108 mmol/L Final    POC Creatinine 03/25/2017 0.8  0.6 - 1.2 mg/dL Final    POC Glucose 03/25/2017 326  73 - 118 mg/dL Final    POC Potassium 03/25/2017 4.5  3.6 - 5.1 mmol/L Final    POC Sodium 03/25/2017 137  128 - 145 mmol/L Final    Bilirubin 03/25/2017 0.5  0.2 - 1.6 mg/dL Final    POC TCO2 03/25/2017 27  18 - 33 mmol/L Final    Protein 03/25/2017 7.4  6.4 - 8.1 g/dL Final    POC PH 03/25/2017 7.326* 7.35 - 7.45 Final    POC PCO2 03/25/2017 61.9* 35 - 45 mmHg Final    POC PO2 03/25/2017 140* 40 - 60 mmHg Final    POC HCO3 03/25/2017 32.3* 24 - 28 mmol/L Final    POC BE 03/25/2017 6  -2 to 2 mmol/L Final    POC SATURATED O2 03/25/2017 99  95 - 100 % Final    POC Lactate 03/25/2017 1.39  0.5 - 2.2 mmol/L Final    POC TCO2 03/25/2017 34* 24 - 29 mmol/L Final    Sample  03/25/2017 VENOUS   Final    Site 03/25/2017 Other   Final    POCT Glucose 03/25/2017 288* 70 - 110 mg/dL Final        Imaging Reviewed    Imaging Results     None          Medications given in ED    Medications   0.9%  NaCl infusion (0 mLs Intravenous Stopped 3/25/17 2128)       Discharge Medications     Medication List with Changes/Refills   New Medications    NYSTATIN (MYCOSTATIN) OINTMENT    Apply topically 2 (two) times daily.   Current Medications    ACCU-CHEK FASTCLIX MISC        ALENDRONATE (FOSAMAX) 70 MG TABLET        AMLODIPINE (NORVASC) 10 MG TABLET    Take 1 tablet (10 mg total) by mouth once daily.    BLOOD SUGAR DIAGNOSTIC (ACCU-CHEK SMARTVIEW TEST STRIP) STRP    use with insulin AS DIRECTED as directed    BLOOD SUGAR DIAGNOSTIC STRP    test AS DIRECTED    BLOOD SUGAR DIAGNOSTIC STRP    test TWO TO THREE TIMES DAILY    BLOOD-GLUCOSE METER (ACCU-CHEK DORCAS) MISC    Please provide glucometer covered by the insurance company    CLINDAMYCIN (CLEOCIN) 150 MG CAPSULE    Take 2 capsules (300 mg total) by mouth every 6 (six) hours.    DESONIDE 0.05% (DESOWEN) 0.05 % OINT    VICTOR HUGO AA BID RASH    DEXTROMETHORPHAN-GUAIFENESIN  MG (MUCINEX DM)  MG PER 12 HR TABLET    Take 1 tablet by mouth every 12 (twelve) hours.    DIAZEPAM (VALIUM) 2 MG TABLET    Take 1 tablet (2 mg total) by mouth daily as needed for Anxiety.    EASY TOUCH TWIST LANCETS 32 GAUGE MISC    test DAILY AS DIRECTED    ESCITALOPRAM OXALATE (LEXAPRO) 10 MG TABLET    Take 1 tablet (10 mg total) by mouth once daily.    FLUCONAZOLE (DIFLUCAN) 200 MG TAB    Take 1 tablet (200 mg total) by mouth once daily.    FLUOCINONIDE (LIDEX) 0.05 % OINTMENT    1 application 2 (two) times daily. Apply to affected area    FREESTYLE LANCETS 28 GAUGE LANCETS    USE DAILY    GABAPENTIN (NEURONTIN) 100 MG CAPSULE    Take 100 mg by mouth 3 (three) times daily.    HYDROCODONE-ACETAMINOPHEN 5-325MG (NORCO) 5-325 MG PER TABLET    Take 1 tablet by mouth every 12  "(twelve) hours as needed for Pain. Severe pain    INSULIN NEEDLES, DISPOSABLE, (NOVOFINE 32) 32 X 1/4 " NDLE    Inject 1 Units into the skin once daily.    LANCETS (FREESTYLE LANCETS) 28 GAUGE MISC    Inject 1 lancet into the skin once daily.    LANTUS SOLOSTAR 100 UNIT/ML (3 ML) INPN PEN    inject 32 UNITS IN THE MORNING    LATANOPROST (XALATAN) 0.005 % OPHTHALMIC SOLUTION    Place 1 drop into both eyes every evening.      LISINOPRIL (PRINIVIL,ZESTRIL) 40 MG TABLET    Take 1 tablet (40 mg total) by mouth once daily.    METOPROLOL TARTRATE (LOPRESSOR) 25 MG TABLET    Take 1 tablet (25 mg total) by mouth 2 (two) times daily.    MICONAZOLE (MICOTIN) 2 % CREAM    Apply topically 2 (two) times daily.    NAPROXEN (NAPROSYN) 500 MG TABLET    Take 1 tablet (500 mg total) by mouth 2 (two) times daily with meals.    NITROFURANTOIN (MACRODANTIN) 100 MG CAPSULE    Take 100 mg by mouth 4 (four) times daily.    NOVOFINE 32 32 GAUGE X 1/4" NDLE    Inject 1 Units into the skin once daily.    NOVOFINE 32 32 GAUGE X 1/4" NDLE    Inject 1 Units into the skin once daily.    POLYETHYLENE GLYCOL (GLYCOLAX) 17 GRAM/DOSE POWDER        SIMVASTATIN (ZOCOR) 40 MG TABLET    TAKE ONE TABLET BY MOUTH IN THE EVENING    TIZANIDINE 2 MG CAP    Take by mouth.    TRAMADOL (ULTRAM) 50 MG TABLET    Take 1 tablet (50 mg total) by mouth every 4 (four) hours as needed for Pain.    TRIAMCINOLONE ACETONIDE 0.1% (KENALOG) 0.1 % OINTMENT                 Patient discharged to home in stable condition with instructions to:   1. Please take all meds as prescribed.  2. Follow-up with your primary care doctor   3. Return precautions discussed and patient and/or family/caretaker understands to return to the emergency room for any concerns including worsening of your current symptoms, fever, chills, night sweats, worsening pain, chest pain, shortness of breath, nausea, vomiting, diarrhea, bleeding, headache, difficulty talking, visual disturbances, weakness, " numbness or any other acute concerns    Clinical Impression:   The primary encounter diagnosis was Hyperglycemia. A diagnosis of Candidal intertrigo was also pertinent to this visit.          Derik Reyna MD  03/25/17 5694

## 2017-03-29 ENCOUNTER — TELEPHONE (OUTPATIENT)
Dept: PODIATRY | Facility: CLINIC | Age: 82
End: 2017-03-29

## 2017-04-04 ENCOUNTER — HOSPITAL ENCOUNTER (OUTPATIENT)
Dept: RADIOLOGY | Facility: HOSPITAL | Age: 82
Discharge: HOME | End: 2017-04-04
Attending: INTERNAL MEDICINE
Payer: MEDICARE

## 2017-04-04 DIAGNOSIS — E11.42 TYPE 2 DIABETES MELLITUS WITH DIABETIC POLYNEUROPATHY, WITH LONG-TERM CURRENT USE OF INSULIN: ICD-10-CM

## 2017-04-04 DIAGNOSIS — Z79.4 TYPE 2 DIABETES MELLITUS WITH DIABETIC POLYNEUROPATHY, WITH LONG-TERM CURRENT USE OF INSULIN: ICD-10-CM

## 2017-04-04 PROCEDURE — 71010 XR CHEST 1 VIEW: CPT | Mod: 26,,, | Performed by: RADIOLOGY

## 2017-04-04 PROCEDURE — 71010 XR CHEST 1 VIEW: CPT | Mod: TC

## 2017-04-17 DIAGNOSIS — E78.5 HYPERLIPIDEMIA LDL GOAL <100: Primary | ICD-10-CM

## 2017-04-17 RX ORDER — SIMVASTATIN 40 MG/1
TABLET, FILM COATED ORAL
Qty: 30 TABLET | Refills: 1 | Status: SHIPPED | OUTPATIENT
Start: 2017-04-17

## 2018-02-03 ENCOUNTER — HOSPITAL ENCOUNTER (INPATIENT)
Facility: HOSPITAL | Age: 83
LOS: 6 days | Discharge: NURSING, REHAB OR LONG-TERM CARE | DRG: 493 | End: 2018-02-09
Attending: EMERGENCY MEDICINE | Admitting: EMERGENCY MEDICINE
Payer: MEDICARE

## 2018-02-03 DIAGNOSIS — S42.492A OTHER CLOSED DISPLACED FRACTURE OF DISTAL END OF LEFT HUMERUS, INITIAL ENCOUNTER: Primary | ICD-10-CM
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
## 2018-02-03 DIAGNOSIS — R30.0 DYSURIA: ICD-10-CM

## 2018-02-03 DIAGNOSIS — N39.0 BACTERIAL UTI: ICD-10-CM

## 2018-02-03 DIAGNOSIS — A49.9 BACTERIAL UTI: ICD-10-CM

## 2018-02-03 PROBLEM — S42.402A CLOSED FRACTURE OF LEFT DISTAL HUMERUS: Status: ACTIVE | Noted: 2018-02-03

## 2018-02-03 LAB
ANION GAP SERPL CALC-SCNC: 10 MMOL/L
BASOPHILS # BLD AUTO: 0.03 K/UL
BASOPHILS NFR BLD: 0.3 %
BUN SERPL-MCNC: 21 MG/DL
CALCIUM SERPL-MCNC: 9.9 MG/DL
CHLORIDE SERPL-SCNC: 101 MMOL/L
CO2 SERPL-SCNC: 25 MMOL/L
CREAT SERPL-MCNC: 1 MG/DL
DIFFERENTIAL METHOD: ABNORMAL
EOSINOPHIL # BLD AUTO: 0.2 K/UL
EOSINOPHIL NFR BLD: 1.8 %
ERYTHROCYTE [DISTWIDTH] IN BLOOD BY AUTOMATED COUNT: 12.9 %
EST. GFR  (AFRICAN AMERICAN): 59 ML/MIN/1.73 M^2
EST. GFR  (NON AFRICAN AMERICAN): 51 ML/MIN/1.73 M^2
GLUCOSE SERPL-MCNC: 187 MG/DL
HCT VFR BLD AUTO: 41.5 %
HGB BLD-MCNC: 13.9 G/DL
INR PPP: 1
LYMPHOCYTES # BLD AUTO: 2.1 K/UL
LYMPHOCYTES NFR BLD: 18 %
MCH RBC QN AUTO: 31.3 PG
MCHC RBC AUTO-ENTMCNC: 33.5 G/DL
MCV RBC AUTO: 94 FL
MONOCYTES # BLD AUTO: 0.9 K/UL
MONOCYTES NFR BLD: 7.8 %
NEUTROPHILS # BLD AUTO: 8.5 K/UL
NEUTROPHILS NFR BLD: 71.8 %
PLATELET # BLD AUTO: 271 K/UL
PMV BLD AUTO: 9.2 FL
POTASSIUM SERPL-SCNC: 4.7 MMOL/L
PROTHROMBIN TIME: 10.1 SEC
RBC # BLD AUTO: 4.44 M/UL
SODIUM SERPL-SCNC: 136 MMOL/L
TROPONIN I SERPL DL<=0.01 NG/ML-MCNC: <0.006 NG/ML
TROPONIN I SERPL DL<=0.01 NG/ML-MCNC: <0.006 NG/ML
WBC # BLD AUTO: 11.88 K/UL

## 2018-02-03 PROCEDURE — 93010 ELECTROCARDIOGRAM REPORT: CPT | Mod: ,,, | Performed by: INTERNAL MEDICINE

## 2018-02-03 PROCEDURE — 85025 COMPLETE CBC W/AUTO DIFF WBC: CPT

## 2018-02-03 PROCEDURE — 63600175 PHARM REV CODE 636 W HCPCS: Performed by: EMERGENCY MEDICINE

## 2018-02-03 PROCEDURE — S0028 INJECTION, FAMOTIDINE, 20 MG: HCPCS | Performed by: EMERGENCY MEDICINE

## 2018-02-03 PROCEDURE — 11000001 HC ACUTE MED/SURG PRIVATE ROOM

## 2018-02-03 PROCEDURE — 84484 ASSAY OF TROPONIN QUANT: CPT | Mod: 91

## 2018-02-03 PROCEDURE — 80048 BASIC METABOLIC PNL TOTAL CA: CPT

## 2018-02-03 PROCEDURE — 99285 EMERGENCY DEPT VISIT HI MDM: CPT | Mod: 25

## 2018-02-03 PROCEDURE — 36415 COLL VENOUS BLD VENIPUNCTURE: CPT

## 2018-02-03 PROCEDURE — 96376 TX/PRO/DX INJ SAME DRUG ADON: CPT

## 2018-02-03 PROCEDURE — 85610 PROTHROMBIN TIME: CPT

## 2018-02-03 PROCEDURE — 25000003 PHARM REV CODE 250: Performed by: EMERGENCY MEDICINE

## 2018-02-03 PROCEDURE — 96374 THER/PROPH/DIAG INJ IV PUSH: CPT

## 2018-02-03 RX ORDER — HYDROMORPHONE HYDROCHLORIDE 2 MG/ML
1 INJECTION, SOLUTION INTRAMUSCULAR; INTRAVENOUS; SUBCUTANEOUS EVERY 4 HOURS PRN
Status: DISCONTINUED | OUTPATIENT
Start: 2018-02-03 | End: 2018-02-06

## 2018-02-03 RX ORDER — HYDROMORPHONE HYDROCHLORIDE 2 MG/ML
1 INJECTION, SOLUTION INTRAMUSCULAR; INTRAVENOUS; SUBCUTANEOUS
Status: COMPLETED | OUTPATIENT
Start: 2018-02-03 | End: 2018-02-03

## 2018-02-03 RX ORDER — INSULIN ASPART 100 [IU]/ML
20 INJECTION, SOLUTION INTRAVENOUS; SUBCUTANEOUS
COMMUNITY

## 2018-02-03 RX ORDER — DOCUSATE SODIUM 100 MG/1
100 CAPSULE, LIQUID FILLED ORAL 2 TIMES DAILY
COMMUNITY

## 2018-02-03 RX ORDER — LISINOPRIL 20 MG/1
20 TABLET ORAL DAILY
Status: ON HOLD | COMMUNITY
End: 2018-02-09 | Stop reason: HOSPADM

## 2018-02-03 RX ORDER — OMEPRAZOLE 40 MG/1
40 CAPSULE, DELAYED RELEASE ORAL DAILY
Status: ON HOLD | COMMUNITY
End: 2018-02-09 | Stop reason: HOSPADM

## 2018-02-03 RX ORDER — ATORVASTATIN CALCIUM 40 MG/1
40 TABLET, FILM COATED ORAL DAILY
COMMUNITY

## 2018-02-03 RX ORDER — FAMOTIDINE 10 MG/ML
20 INJECTION INTRAVENOUS EVERY 12 HOURS
Status: DISCONTINUED | OUTPATIENT
Start: 2018-02-03 | End: 2018-02-09 | Stop reason: HOSPADM

## 2018-02-03 RX ORDER — ONDANSETRON 2 MG/ML
4 INJECTION INTRAMUSCULAR; INTRAVENOUS EVERY 8 HOURS PRN
Status: DISCONTINUED | OUTPATIENT
Start: 2018-02-03 | End: 2018-02-09 | Stop reason: HOSPADM

## 2018-02-03 RX ORDER — SODIUM CHLORIDE 0.9 % (FLUSH) 0.9 %
3 SYRINGE (ML) INJECTION EVERY 8 HOURS
Status: DISCONTINUED | OUTPATIENT
Start: 2018-02-03 | End: 2018-02-09 | Stop reason: HOSPADM

## 2018-02-03 RX ORDER — LISINOPRIL 20 MG/1
20 TABLET ORAL DAILY
Status: DISCONTINUED | OUTPATIENT
Start: 2018-02-04 | End: 2018-02-09 | Stop reason: HOSPADM

## 2018-02-03 RX ORDER — HYDROMORPHONE HYDROCHLORIDE 2 MG/ML
0.5 INJECTION, SOLUTION INTRAMUSCULAR; INTRAVENOUS; SUBCUTANEOUS
Status: COMPLETED | OUTPATIENT
Start: 2018-02-03 | End: 2018-02-03

## 2018-02-03 RX ORDER — DIPHENHYDRAMINE HCL 25 MG
25 CAPSULE ORAL EVERY 6 HOURS PRN
Status: ON HOLD | COMMUNITY
End: 2018-02-09 | Stop reason: HOSPADM

## 2018-02-03 RX ORDER — METOPROLOL TARTRATE 25 MG/1
25 TABLET, FILM COATED ORAL 2 TIMES DAILY
Status: DISCONTINUED | OUTPATIENT
Start: 2018-02-03 | End: 2018-02-09 | Stop reason: HOSPADM

## 2018-02-03 RX ORDER — ACETAMINOPHEN 325 MG/1
650 TABLET ORAL EVERY 8 HOURS PRN
Status: DISCONTINUED | OUTPATIENT
Start: 2018-02-03 | End: 2018-02-09 | Stop reason: HOSPADM

## 2018-02-03 RX ORDER — HYDROMORPHONE HYDROCHLORIDE 2 MG/ML
0.5 INJECTION, SOLUTION INTRAMUSCULAR; INTRAVENOUS; SUBCUTANEOUS EVERY 4 HOURS PRN
Status: DISCONTINUED | OUTPATIENT
Start: 2018-02-03 | End: 2018-02-06

## 2018-02-03 RX ORDER — AMOXICILLIN 250 MG
1 CAPSULE ORAL 2 TIMES DAILY
Status: DISCONTINUED | OUTPATIENT
Start: 2018-02-03 | End: 2018-02-05

## 2018-02-03 RX ORDER — RAMELTEON 8 MG/1
8 TABLET ORAL NIGHTLY PRN
Status: DISCONTINUED | OUTPATIENT
Start: 2018-02-03 | End: 2018-02-09 | Stop reason: HOSPADM

## 2018-02-03 RX ORDER — AMLODIPINE BESYLATE 5 MG/1
10 TABLET ORAL DAILY
Status: DISCONTINUED | OUTPATIENT
Start: 2018-02-04 | End: 2018-02-09 | Stop reason: HOSPADM

## 2018-02-03 RX ADMIN — HYDROMORPHONE HYDROCHLORIDE 1 MG: 2 INJECTION, SOLUTION INTRAMUSCULAR; INTRAVENOUS; SUBCUTANEOUS at 06:02

## 2018-02-03 RX ADMIN — DOCUSATE SODIUM AND SENNOSIDES 1 TABLET: 8.6; 5 TABLET, FILM COATED ORAL at 09:02

## 2018-02-03 RX ADMIN — METOPROLOL TARTRATE 25 MG: 25 TABLET, FILM COATED ORAL at 09:02

## 2018-02-03 RX ADMIN — FAMOTIDINE 20 MG: 10 INJECTION, SOLUTION INTRAVENOUS at 09:02

## 2018-02-03 RX ADMIN — RAMELTEON 8 MG: 8 TABLET, FILM COATED ORAL at 09:02

## 2018-02-03 RX ADMIN — ACETAMINOPHEN 650 MG: 325 TABLET ORAL at 09:02

## 2018-02-03 RX ADMIN — HYDROMORPHONE HYDROCHLORIDE 0.5 MG: 2 INJECTION, SOLUTION INTRAMUSCULAR; INTRAVENOUS; SUBCUTANEOUS at 05:02

## 2018-02-03 NOTE — ED TRIAGE NOTES
Pt is resident of nursing home but was at family's house for visit.  When walking inside from rainy outside slipped and fell with walker, landing on left side.  Complaints of pain to left elbow w/ obvious deformity.  EMS presented pt w/ c-collar and LSB in place.  Left arm secured with tape to body.  Denies hitting head, loc, dizziness, chest pains, or sob.

## 2018-02-03 NOTE — ED PROVIDER NOTES
Encounter Date: 2/3/2018    SCRIBE #1 NOTE: I, Leora Martins, am scribing for, and in the presence of,  Nazario Simon MD. I have scribed the following portions of the note - Other sections scribed: HPI, ROS, PE.       History     Chief Complaint   Patient presents with    Fall     Pt here via EMS for trip and fall, possible arm fracture-swelling noted. pt reports pain 10/10. Pt reports hitting head, denies any LOC, blurry vision.      CC: Fall    HPI: 86 year old female with DM, HLD, HTN, glaucoma, and osteoporosis presents to the ED via EMS in c-collar and spine board c/o L arm and elbow pain s/p slip and fall PTA. Pt reports she was walking in her house and slipped and fell landing on her L arm. Pain is 10/10. Pain is exacerbated with palpation and with movement. No head trauma or LOC. Pt denies having any chest pain, SOB, or dizziness prior to the fall. No other injuries noted. Pt denies neck pain, back pain, headaches, vision changes, and any other associated symptoms. no prior attempted treatment. No alleviating factors.      The history is provided by the patient. No  was used.     Review of patient's allergies indicates:   Allergen Reactions    Bactrim [sulfamethoxazole-trimethoprim]     Cephalexin Other (See Comments)     Leg cramps    Codeine Itching    Demerol [meperidine]     Etodolac Diarrhea    Metformin Diarrhea    Naproxen     Sulfur     Terbinafine     Darvocet a500 [propoxyphene n-acetaminophen] Rash     Past Medical History:   Diagnosis Date    Cataract     Diabetes mellitus type II     Glaucoma     Hyperlipidemia     Hypertension     Osteoporosis      Past Surgical History:   Procedure Laterality Date    CHOLECYSTECTOMY      excision left axillary mass      excision thigh masses      EYE SURGERY      HEMORRHOID SURGERY      HYSTERECTOMY      VULVECTOMY       Family History   Problem Relation Age of Onset    Diabetes Mother     Diabetes Son      Breast cancer Neg Hx     Colon cancer Neg Hx     Ovarian cancer Neg Hx      Social History   Substance Use Topics    Smoking status: Never Smoker    Smokeless tobacco: Never Used    Alcohol use No     Review of Systems   Constitutional: Negative for chills, diaphoresis and fever.   HENT: Negative for ear pain and sore throat.    Eyes: Negative for photophobia and visual disturbance.   Respiratory: Negative for cough and shortness of breath.    Cardiovascular: Negative for chest pain.   Gastrointestinal: Negative for abdominal pain, diarrhea, nausea and vomiting.   Genitourinary: Negative for dysuria.   Musculoskeletal: Negative for back pain and neck pain.        (+) L arm and elbow pain   Skin: Negative for rash.   Neurological: Negative for dizziness, weakness, numbness and headaches.        (-) LOC   Psychiatric/Behavioral: Negative for confusion.       Physical Exam     Initial Vitals [02/03/18 1551]   BP Pulse Resp Temp SpO2   (!) 148/70 85 18 97.5 °F (36.4 °C) 98 %      MAP       96         Physical Exam    Nursing note and vitals reviewed.  Constitutional: She appears well-developed and well-nourished. No distress. Cervical collar and backboard in place.   HENT:   Head: Normocephalic and atraumatic.   Nose: Nose normal.   Eyes: EOM are normal. Pupils are equal, round, and reactive to light.   Neck: Normal range of motion. Neck supple.   No neck tenderness, step offs, or deformities   Cardiovascular: Normal rate, regular rhythm, normal heart sounds and intact distal pulses. Exam reveals no gallop and no friction rub.    Pulmonary/Chest: Breath sounds normal. No respiratory distress. She has no rhonchi. She has no rales. She exhibits no tenderness.   Abdominal: Soft. Normal appearance and bowel sounds are normal. She exhibits no distension. There is no tenderness. There is no rebound and no guarding.   Musculoskeletal: Normal range of motion. She exhibits no edema.   Deformity to the L elbow with tenting  of skin. Neurovascularly intact distally.   Neurological: She is alert and oriented to person, place, and time. No cranial nerve deficit.   Skin: Skin is warm and dry.   Psychiatric: She has a normal mood and affect. Her behavior is normal.         ED Course   Procedures  Labs Reviewed   CBC W/ AUTO DIFFERENTIAL - Abnormal; Notable for the following:        Result Value    MCH 31.3 (*)     Gran # (ANC) 8.5 (*)     All other components within normal limits   BASIC METABOLIC PANEL - Abnormal; Notable for the following:     Glucose 187 (*)     eGFR if  59 (*)     eGFR if non  51 (*)     All other components within normal limits   PROTIME-INR   TROPONIN I             Medical Decision Making:   Initial Assessment:   86-year-old female with mechanical fall at home.  Denies prodrome or concern for cardiac or neurogenic cause.  On exam,'s significant pain and deformity to left arm.  Neurovascularly intact distal.  Able to move fingers and have normal sensation distally.  Range of motion of shoulder and elbow limited due to pain.  X-ray reveals significant we displaced fracture.  Discussed with ortho on call.  We'll admit patient to medicine with ortho consult for likely surgical intervention.  Discussed patient with Dr. York's who accepted on service.             Scribe Attestation:   Scribe #1: I performed the above scribed service and the documentation accurately describes the services I performed. I attest to the accuracy of the note.    Attending Attestation:           Physician Attestation for Scribe:  Physician Attestation Statement for Scribe #1: I, Nazario Simon MD, reviewed documentation, as scribed by Leora Martins in my presence, and it is both accurate and complete.                 ED Course      Clinical Impression:   The primary encounter diagnosis was Other closed displaced fracture of distal end of left humerus, initial encounter. Diagnoses of Fall, Dysuria, and  Bacterial UTI were also pertinent to this visit.    Disposition:   Disposition: Admitted  Condition: Stable                        Nazario Simon MD  02/13/18 0250

## 2018-02-04 PROBLEM — R09.02 HYPOXIA: Status: ACTIVE | Noted: 2018-02-04

## 2018-02-04 PROBLEM — R52 ACUTE PAIN: Status: ACTIVE | Noted: 2018-02-04
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
## 2018-02-04 LAB
ANION GAP SERPL CALC-SCNC: 9 MMOL/L
BASOPHILS # BLD AUTO: 0.02 K/UL
BASOPHILS NFR BLD: 0.3 %
BUN SERPL-MCNC: 20 MG/DL
CALCIUM SERPL-MCNC: 9.5 MG/DL
CHLORIDE SERPL-SCNC: 102 MMOL/L
CO2 SERPL-SCNC: 26 MMOL/L
CREAT SERPL-MCNC: 0.9 MG/DL
DIFFERENTIAL METHOD: ABNORMAL
EOSINOPHIL # BLD AUTO: 0 K/UL
EOSINOPHIL NFR BLD: 0.1 %
ERYTHROCYTE [DISTWIDTH] IN BLOOD BY AUTOMATED COUNT: 13.2 %
EST. GFR  (AFRICAN AMERICAN): >60 ML/MIN/1.73 M^2
EST. GFR  (NON AFRICAN AMERICAN): 58 ML/MIN/1.73 M^2
ESTIMATED AVG GLUCOSE: 192 MG/DL
GLUCOSE SERPL-MCNC: 187 MG/DL
HBA1C MFR BLD HPLC: 8.3 %
HCT VFR BLD AUTO: 37 %
HGB BLD-MCNC: 12.7 G/DL
LYMPHOCYTES # BLD AUTO: 1.6 K/UL
LYMPHOCYTES NFR BLD: 21.6 %
MCH RBC QN AUTO: 32.1 PG
MCHC RBC AUTO-ENTMCNC: 34.3 G/DL
MCV RBC AUTO: 93 FL
MONOCYTES # BLD AUTO: 0.8 K/UL
MONOCYTES NFR BLD: 11.1 %
NEUTROPHILS # BLD AUTO: 4.8 K/UL
NEUTROPHILS NFR BLD: 66.9 %
PLATELET # BLD AUTO: 248 K/UL
PMV BLD AUTO: 9.4 FL
POCT GLUCOSE: 164 MG/DL (ref 70–110)
POCT GLUCOSE: 210 MG/DL (ref 70–110)
POCT GLUCOSE: 230 MG/DL (ref 70–110)
POCT GLUCOSE: 259 MG/DL (ref 70–110)
POTASSIUM SERPL-SCNC: 4.5 MMOL/L
RBC # BLD AUTO: 3.96 M/UL
SODIUM SERPL-SCNC: 137 MMOL/L
TROPONIN I SERPL DL<=0.01 NG/ML-MCNC: <0.006 NG/ML
WBC # BLD AUTO: 7.21 K/UL

## 2018-02-04 PROCEDURE — A4216 STERILE WATER/SALINE, 10 ML: HCPCS | Performed by: EMERGENCY MEDICINE

## 2018-02-04 PROCEDURE — S0028 INJECTION, FAMOTIDINE, 20 MG: HCPCS | Performed by: EMERGENCY MEDICINE

## 2018-02-04 PROCEDURE — 63600175 PHARM REV CODE 636 W HCPCS: Performed by: INTERNAL MEDICINE

## 2018-02-04 PROCEDURE — 25000003 PHARM REV CODE 250: Performed by: EMERGENCY MEDICINE

## 2018-02-04 PROCEDURE — 36415 COLL VENOUS BLD VENIPUNCTURE: CPT

## 2018-02-04 PROCEDURE — 63600175 PHARM REV CODE 636 W HCPCS: Performed by: EMERGENCY MEDICINE

## 2018-02-04 PROCEDURE — 11000001 HC ACUTE MED/SURG PRIVATE ROOM

## 2018-02-04 PROCEDURE — 83036 HEMOGLOBIN GLYCOSYLATED A1C: CPT

## 2018-02-04 PROCEDURE — 84484 ASSAY OF TROPONIN QUANT: CPT

## 2018-02-04 PROCEDURE — 25000003 PHARM REV CODE 250: Performed by: ORTHOPAEDIC SURGERY

## 2018-02-04 PROCEDURE — 80048 BASIC METABOLIC PNL TOTAL CA: CPT

## 2018-02-04 PROCEDURE — 85025 COMPLETE CBC W/AUTO DIFF WBC: CPT

## 2018-02-04 RX ORDER — IBUPROFEN 200 MG
24 TABLET ORAL
Status: DISCONTINUED | OUTPATIENT
Start: 2018-02-04 | End: 2018-02-09 | Stop reason: HOSPADM

## 2018-02-04 RX ORDER — GLUCAGON 1 MG
1 KIT INJECTION
Status: DISCONTINUED | OUTPATIENT
Start: 2018-02-04 | End: 2018-02-09 | Stop reason: HOSPADM

## 2018-02-04 RX ORDER — VANCOMYCIN HYDROCHLORIDE 500 MG/10ML
1000 INJECTION, POWDER, LYOPHILIZED, FOR SOLUTION INTRAVENOUS
Status: DISCONTINUED | OUTPATIENT
Start: 2018-02-05 | End: 2018-02-04 | Stop reason: CLARIF

## 2018-02-04 RX ORDER — IBUPROFEN 200 MG
16 TABLET ORAL
Status: DISCONTINUED | OUTPATIENT
Start: 2018-02-04 | End: 2018-02-09 | Stop reason: HOSPADM

## 2018-02-04 RX ORDER — INSULIN ASPART 100 [IU]/ML
0-5 INJECTION, SOLUTION INTRAVENOUS; SUBCUTANEOUS
Status: DISCONTINUED | OUTPATIENT
Start: 2018-02-04 | End: 2018-02-09 | Stop reason: HOSPADM

## 2018-02-04 RX ORDER — LATANOPROST 50 UG/ML
1 SOLUTION/ DROPS OPHTHALMIC NIGHTLY
Status: DISCONTINUED | OUTPATIENT
Start: 2018-02-04 | End: 2018-02-09 | Stop reason: HOSPADM

## 2018-02-04 RX ADMIN — LISINOPRIL 20 MG: 20 TABLET ORAL at 08:02

## 2018-02-04 RX ADMIN — Medication 3 ML: at 01:02

## 2018-02-04 RX ADMIN — METOPROLOL TARTRATE 25 MG: 25 TABLET, FILM COATED ORAL at 09:02

## 2018-02-04 RX ADMIN — HYDROMORPHONE HYDROCHLORIDE 1 MG: 2 INJECTION, SOLUTION INTRAMUSCULAR; INTRAVENOUS; SUBCUTANEOUS at 08:02

## 2018-02-04 RX ADMIN — LATANOPROST 1 DROP: 50 SOLUTION/ DROPS OPHTHALMIC at 08:02

## 2018-02-04 RX ADMIN — DOCUSATE SODIUM AND SENNOSIDES 1 TABLET: 8.6; 5 TABLET, FILM COATED ORAL at 08:02

## 2018-02-04 RX ADMIN — INSULIN ASPART 1 UNITS: 100 INJECTION, SOLUTION INTRAVENOUS; SUBCUTANEOUS at 09:02

## 2018-02-04 RX ADMIN — Medication 3 ML: at 06:02

## 2018-02-04 RX ADMIN — INSULIN ASPART 2 UNITS: 100 INJECTION, SOLUTION INTRAVENOUS; SUBCUTANEOUS at 05:02

## 2018-02-04 RX ADMIN — Medication 3 ML: at 08:02

## 2018-02-04 RX ADMIN — HYDROMORPHONE HYDROCHLORIDE 1 MG: 2 INJECTION, SOLUTION INTRAMUSCULAR; INTRAVENOUS; SUBCUTANEOUS at 02:02

## 2018-02-04 RX ADMIN — HYDROMORPHONE HYDROCHLORIDE 1 MG: 2 INJECTION, SOLUTION INTRAMUSCULAR; INTRAVENOUS; SUBCUTANEOUS at 11:02

## 2018-02-04 RX ADMIN — FAMOTIDINE 20 MG: 10 INJECTION, SOLUTION INTRAVENOUS at 08:02

## 2018-02-04 RX ADMIN — INSULIN ASPART 3 UNITS: 100 INJECTION, SOLUTION INTRAVENOUS; SUBCUTANEOUS at 11:02

## 2018-02-04 RX ADMIN — HYDROMORPHONE HYDROCHLORIDE 1 MG: 2 INJECTION, SOLUTION INTRAMUSCULAR; INTRAVENOUS; SUBCUTANEOUS at 07:02

## 2018-02-04 RX ADMIN — HYDROMORPHONE HYDROCHLORIDE 1 MG: 2 INJECTION, SOLUTION INTRAMUSCULAR; INTRAVENOUS; SUBCUTANEOUS at 03:02

## 2018-02-04 RX ADMIN — AMLODIPINE BESYLATE 10 MG: 5 TABLET ORAL at 08:02

## 2018-02-04 RX ADMIN — METOPROLOL TARTRATE 25 MG: 25 TABLET, FILM COATED ORAL at 08:02

## 2018-02-04 NOTE — CONSULTS
DATE OF CONSULTATION:  02/04/2018    HISTORY OF PRESENT ILLNESS:  An 86-year-old female.  She is a resident of   nursing home.  Apparently, she fell with her walker, landing on the left side.    She complained of pain in left elbow.  She came to [facility].  Diagnosis was made of a fracture of the left distal humerus just above   the elbow.  The patient was admitted for medical reasons and possible surgery   for the left elbow.    PAST HISTORY:  The patient has dementia according to her family.  She had a   history of diabetes, hypertension and glaucoma.    Today, the patient is sitting up in her bed slightly.  She is complaining of lot   pain in the left arm.  There is obvious deformity at the elbow.  Sensation is   intact.  Pulses 2/4.    X-rays showed displaced fracture of the supracondylar area of the left humerus.    This area is very difficult to reduce and hold for surgery.  It really takes two   surgeons or surgeon and PA to do this.  I have explained to the family.  I am   not able to do this today without assistance.  We will try to get it done   tomorrow if she is medically cleared by Dr. Wilson.      DIAGNOSIS:  Displaced fracture, supracondylar area of the left humerus.  Plan is   to surgery if she is medically cleared tomorrow.      RLS/IN  dd: 02/04/2018 09:56:53 (CST)  td: 02/04/2018 10:30:37 (CST)  Doc ID   #[id number]  Job ID #[id number]    CC:

## 2018-02-04 NOTE — PLAN OF CARE
Problem: Patient Care Overview  Goal: Plan of Care Review  Outcome: Ongoing (interventions implemented as appropriate)  AAOX3.  c/o pain x 2 during the shift.  Tolerated scheduled meds. IV site cdi; saline lock. TEDs and SCDs in place.   Vitals within normal limits. Bed in the lowest position, call light within reach, bedside table near.  Hourly rounding conducted to ensure safety and assist with personal care needs.  Surgical bath given.  No acute distress noted will continue to monitor.

## 2018-02-04 NOTE — ED NOTES
Spoke with Dr. Smion, pt. Wants the c collar removed.    C.t. Negative.  Ok by Dr. Simon to remove c collar.      C. Collar removed from pt. At this time.

## 2018-02-04 NOTE — PLAN OF CARE
"To OR on Monday.  TN completed discharge needs assessment. TN provided and reviewed with patient "[facility] My Health Packet" , "Help At Home" and "Discharge Planning Begins on Admission" handouts. TN discussed with patient the things the patient is responsible for to manage patient's  healthcare at home. Patient verbalized understanding & teachback implemented. At [facility], care home for a year.      Expect to go to surg for left arm on  Monday. Was at home visiting for 3 days when she fell.     02/04/18 1355   Discharge Assessment   Assessment Type Discharge Planning Assessment   Confirmed/corrected address and phone number on facesheet? Yes   Assessment information obtained from? Patient;Caregiver   Communicated expected length of stay with patient/caregiver no   Prior to hospitilization cognitive status: Alert/Oriented;No Deficits   Prior to hospitalization functional status: Needs Assistance;Assistive Equipment   Current cognitive status: Alert/Oriented   Current Functional Status: Assistive Equipment;Needs Assistance   Facility Arrived From: ([facility])   Lives With facility resident   Able to Return to Prior Arrangements yes   Is patient able to care for self after discharge? Patient is of pediatric age   Patient's perception of discharge disposition nursing home   Readmission Within The Last 30 Days no previous admission in last 30 days   Equipment Currently Used at Home other (see comments)  (DME@ [facility])   Do you have any problems affording any of your prescribed medications? No   Is the patient taking medications as prescribed? yes   Does the patient have transportation home? Yes   Transportation Available agency transportation;van, wheelchair accessible;ambulance   Does the patient receive services at the [facility]? No   Discharge Plan A Return to nursing home   Discharge Plan B Skilled Nursing Facility   Patient/Family In Agreement With Plan yes   [facility]  "

## 2018-02-04 NOTE — PROGRESS NOTES
Pt. With a displaced supracondylar fracture of the left elbow. This type of fracture is very difficult to hold reduced for plating and requires 2 surgeons or a surgeon and a PA. I will see if Kanwal Farrell is able to do this tomorrow. She can eat today.

## 2018-02-04 NOTE — PROGRESS NOTES
Left arm splinted. Dr. Schofield will operate tomorrow. Daughter has signed the consent. Risks and complications of surgery discussed. No guarantees given.

## 2018-02-05 ENCOUNTER — ANESTHESIA (OUTPATIENT)
Dept: SURGERY | Facility: HOSPITAL | Age: 83
DRG: 493 | End: 2018-02-05
Payer: MEDICARE

## 2018-02-05 ENCOUNTER — ANESTHESIA EVENT (OUTPATIENT)
Dept: SURGERY | Facility: HOSPITAL | Age: 83
DRG: 493 | End: 2018-02-05
Payer: MEDICARE

## 2018-02-05 LAB
ANION GAP SERPL CALC-SCNC: 10 MMOL/L
ANION GAP SERPL CALC-SCNC: 8 MMOL/L
BACTERIA #/AREA URNS HPF: ABNORMAL /HPF
BASOPHILS # BLD AUTO: 0.02 K/UL
BASOPHILS NFR BLD: 0.3 %
BILIRUB UR QL STRIP: NEGATIVE
BUN SERPL-MCNC: 31 MG/DL
BUN SERPL-MCNC: 33 MG/DL
CALCIUM SERPL-MCNC: 9 MG/DL
CALCIUM SERPL-MCNC: 9.5 MG/DL
CHLORIDE SERPL-SCNC: 103 MMOL/L
CHLORIDE SERPL-SCNC: 104 MMOL/L
CLARITY UR: ABNORMAL
CO2 SERPL-SCNC: 23 MMOL/L
CO2 SERPL-SCNC: 26 MMOL/L
COLOR UR: YELLOW
CREAT SERPL-MCNC: 1.2 MG/DL
CREAT SERPL-MCNC: 1.5 MG/DL
DIFFERENTIAL METHOD: ABNORMAL
EOSINOPHIL # BLD AUTO: 0 K/UL
EOSINOPHIL NFR BLD: 0.5 %
ERYTHROCYTE [DISTWIDTH] IN BLOOD BY AUTOMATED COUNT: 13.4 %
EST. GFR  (AFRICAN AMERICAN): 36 ML/MIN/1.73 M^2
EST. GFR  (AFRICAN AMERICAN): 47 ML/MIN/1.73 M^2
EST. GFR  (NON AFRICAN AMERICAN): 31 ML/MIN/1.73 M^2
EST. GFR  (NON AFRICAN AMERICAN): 41 ML/MIN/1.73 M^2
GLUCOSE SERPL-MCNC: 228 MG/DL
GLUCOSE SERPL-MCNC: 235 MG/DL
GLUCOSE UR QL STRIP: ABNORMAL
HCT VFR BLD AUTO: 36.1 %
HCT VFR BLD AUTO: 36.5 %
HGB BLD-MCNC: 11.8 G/DL
HGB BLD-MCNC: 11.9 G/DL
HGB UR QL STRIP: ABNORMAL
KETONES UR QL STRIP: ABNORMAL
LEUKOCYTE ESTERASE UR QL STRIP: ABNORMAL
LYMPHOCYTES # BLD AUTO: 2 K/UL
LYMPHOCYTES NFR BLD: 25.5 %
MCH RBC QN AUTO: 31.3 PG
MCHC RBC AUTO-ENTMCNC: 32.3 G/DL
MCV RBC AUTO: 97 FL
MICROSCOPIC COMMENT: ABNORMAL
MONOCYTES # BLD AUTO: 1.2 K/UL
MONOCYTES NFR BLD: 15.1 %
NEUTROPHILS # BLD AUTO: 4.5 K/UL
NEUTROPHILS NFR BLD: 58.6 %
NITRITE UR QL STRIP: POSITIVE
PH UR STRIP: 5 [PH] (ref 5–8)
PLATELET # BLD AUTO: 219 K/UL
PMV BLD AUTO: 9.1 FL
POCT GLUCOSE: 219 MG/DL (ref 70–110)
POCT GLUCOSE: 230 MG/DL (ref 70–110)
POCT GLUCOSE: 244 MG/DL (ref 70–110)
POTASSIUM SERPL-SCNC: 4.7 MMOL/L
POTASSIUM SERPL-SCNC: 5.3 MMOL/L
PROT UR QL STRIP: NEGATIVE
RBC # BLD AUTO: 3.77 M/UL
RBC #/AREA URNS HPF: 2 /HPF (ref 0–4)
SODIUM SERPL-SCNC: 137 MMOL/L
SODIUM SERPL-SCNC: 137 MMOL/L
SP GR UR STRIP: 1.02 (ref 1–1.03)
URN SPEC COLLECT METH UR: ABNORMAL
UROBILINOGEN UR STRIP-ACNC: NEGATIVE EU/DL
WBC # BLD AUTO: 7.73 K/UL
WBC #/AREA URNS HPF: >100 /HPF (ref 0–5)
YEAST URNS QL MICRO: ABNORMAL

## 2018-02-05 PROCEDURE — C1769 GUIDE WIRE: HCPCS | Performed by: ORTHOPAEDIC SURGERY

## 2018-02-05 PROCEDURE — 25000003 PHARM REV CODE 250: Performed by: NURSE ANESTHETIST, CERTIFIED REGISTERED

## 2018-02-05 PROCEDURE — 37000009 HC ANESTHESIA EA ADD 15 MINS: Performed by: ORTHOPAEDIC SURGERY

## 2018-02-05 PROCEDURE — S0028 INJECTION, FAMOTIDINE, 20 MG: HCPCS | Performed by: EMERGENCY MEDICINE

## 2018-02-05 PROCEDURE — 85018 HEMOGLOBIN: CPT

## 2018-02-05 PROCEDURE — 63600175 PHARM REV CODE 636 W HCPCS: Performed by: ORTHOPAEDIC SURGERY

## 2018-02-05 PROCEDURE — 63600175 PHARM REV CODE 636 W HCPCS: Performed by: NURSE ANESTHETIST, CERTIFIED REGISTERED

## 2018-02-05 PROCEDURE — 87088 URINE BACTERIA CULTURE: CPT

## 2018-02-05 PROCEDURE — 80048 BASIC METABOLIC PNL TOTAL CA: CPT | Mod: 91

## 2018-02-05 PROCEDURE — S0077 INJECTION, CLINDAMYCIN PHOSP: HCPCS | Performed by: ORTHOPAEDIC SURGERY

## 2018-02-05 PROCEDURE — 36415 COLL VENOUS BLD VENIPUNCTURE: CPT

## 2018-02-05 PROCEDURE — 85014 HEMATOCRIT: CPT

## 2018-02-05 PROCEDURE — 11000001 HC ACUTE MED/SURG PRIVATE ROOM

## 2018-02-05 PROCEDURE — 87077 CULTURE AEROBIC IDENTIFY: CPT

## 2018-02-05 PROCEDURE — 87086 URINE CULTURE/COLONY COUNT: CPT

## 2018-02-05 PROCEDURE — 85025 COMPLETE CBC W/AUTO DIFF WBC: CPT

## 2018-02-05 PROCEDURE — 27201423 OPTIME MED/SURG SUP & DEVICES STERILE SUPPLY: Performed by: ORTHOPAEDIC SURGERY

## 2018-02-05 PROCEDURE — 81000 URINALYSIS NONAUTO W/SCOPE: CPT

## 2018-02-05 PROCEDURE — 25000003 PHARM REV CODE 250: Performed by: ORTHOPAEDIC SURGERY

## 2018-02-05 PROCEDURE — D9220A PRA ANESTHESIA: Mod: CRNA,,, | Performed by: NURSE ANESTHETIST, CERTIFIED REGISTERED

## 2018-02-05 PROCEDURE — D9220A PRA ANESTHESIA: Mod: ANES,,, | Performed by: ANESTHESIOLOGY

## 2018-02-05 PROCEDURE — 63600175 PHARM REV CODE 636 W HCPCS: Performed by: EMERGENCY MEDICINE

## 2018-02-05 PROCEDURE — A4216 STERILE WATER/SALINE, 10 ML: HCPCS | Performed by: EMERGENCY MEDICINE

## 2018-02-05 PROCEDURE — 37000008 HC ANESTHESIA 1ST 15 MINUTES: Performed by: ORTHOPAEDIC SURGERY

## 2018-02-05 PROCEDURE — 63600175 PHARM REV CODE 636 W HCPCS: Performed by: ANESTHESIOLOGY

## 2018-02-05 PROCEDURE — 25000003 PHARM REV CODE 250: Performed by: EMERGENCY MEDICINE

## 2018-02-05 PROCEDURE — 0PSG04Z REPOSITION LEFT HUMERAL SHAFT WITH INTERNAL FIXATION DEVICE, OPEN APPROACH: ICD-10-PCS | Performed by: ORTHOPAEDIC SURGERY

## 2018-02-05 PROCEDURE — 36000711: Performed by: ORTHOPAEDIC SURGERY

## 2018-02-05 PROCEDURE — C1713 ANCHOR/SCREW BN/BN,TIS/BN: HCPCS | Performed by: ORTHOPAEDIC SURGERY

## 2018-02-05 PROCEDURE — 87186 SC STD MICRODIL/AGAR DIL: CPT

## 2018-02-05 PROCEDURE — 36000710: Performed by: ORTHOPAEDIC SURGERY

## 2018-02-05 PROCEDURE — 71000033 HC RECOVERY, INTIAL HOUR: Performed by: ORTHOPAEDIC SURGERY

## 2018-02-05 DEVICE — SCREW LOCK HEXALOBE 2.7 X 20MM: Type: IMPLANTABLE DEVICE | Site: ARM | Status: FUNCTIONAL

## 2018-02-05 DEVICE — SCREW LOCK HEXALOBE 3.5 X 24MM: Type: IMPLANTABLE DEVICE | Site: ARM | Status: FUNCTIONAL

## 2018-02-05 DEVICE — SCREW BONE NLHEXALOBE 3.5 X 18: Type: IMPLANTABLE DEVICE | Site: ARM | Status: FUNCTIONAL

## 2018-02-05 DEVICE — SCREW LOCK HEXALOBE 3.5X20MM: Type: IMPLANTABLE DEVICE | Site: ARM | Status: FUNCTIONAL

## 2018-02-05 DEVICE — SCREW BNE LOK HEXLB 3.5X16: Type: IMPLANTABLE DEVICE | Site: ARM | Status: FUNCTIONAL

## 2018-02-05 DEVICE — SCREW BONE LOCK HEXALOBE 2.7 X: Type: IMPLANTABLE DEVICE | Site: ARM | Status: FUNCTIONAL

## 2018-02-05 DEVICE — SCREW BONE NL HEXALOBE 3.5 X 2: Type: IMPLANTABLE DEVICE | Site: ARM | Status: FUNCTIONAL

## 2018-02-05 DEVICE — SCREW BNE N LOK HEXLB 3.5X14: Type: IMPLANTABLE DEVICE | Site: ARM | Status: FUNCTIONAL

## 2018-02-05 RX ORDER — SODIUM CHLORIDE 0.9 G/100ML
IRRIGANT IRRIGATION
Status: DISCONTINUED | OUTPATIENT
Start: 2018-02-05 | End: 2018-02-05 | Stop reason: HOSPADM

## 2018-02-05 RX ORDER — METOCLOPRAMIDE HYDROCHLORIDE 5 MG/ML
10 INJECTION INTRAMUSCULAR; INTRAVENOUS EVERY 10 MIN PRN
Status: DISCONTINUED | OUTPATIENT
Start: 2018-02-05 | End: 2018-02-05 | Stop reason: HOSPADM

## 2018-02-05 RX ORDER — SODIUM CHLORIDE 0.9 % (FLUSH) 0.9 %
3 SYRINGE (ML) INJECTION
Status: DISCONTINUED | OUTPATIENT
Start: 2018-02-05 | End: 2018-02-05 | Stop reason: HOSPADM

## 2018-02-05 RX ORDER — DOCUSATE SODIUM 100 MG/1
100 CAPSULE, LIQUID FILLED ORAL 2 TIMES DAILY
Status: DISCONTINUED | OUTPATIENT
Start: 2018-02-05 | End: 2018-02-09 | Stop reason: HOSPADM

## 2018-02-05 RX ORDER — MEPERIDINE HYDROCHLORIDE 50 MG/ML
12.5 INJECTION INTRAMUSCULAR; INTRAVENOUS; SUBCUTANEOUS ONCE AS NEEDED
Status: DISCONTINUED | OUTPATIENT
Start: 2018-02-05 | End: 2018-02-05 | Stop reason: HOSPADM

## 2018-02-05 RX ORDER — SUCCINYLCHOLINE CHLORIDE 20 MG/ML
INJECTION INTRAMUSCULAR; INTRAVENOUS
Status: DISCONTINUED | OUTPATIENT
Start: 2018-02-05 | End: 2018-02-05

## 2018-02-05 RX ORDER — ACETAMINOPHEN 10 MG/ML
1000 INJECTION, SOLUTION INTRAVENOUS ONCE
Status: COMPLETED | OUTPATIENT
Start: 2018-02-05 | End: 2018-02-05

## 2018-02-05 RX ORDER — DIPHENHYDRAMINE HYDROCHLORIDE 50 MG/ML
25 INJECTION INTRAMUSCULAR; INTRAVENOUS EVERY 6 HOURS PRN
Status: DISCONTINUED | OUTPATIENT
Start: 2018-02-05 | End: 2018-02-05 | Stop reason: HOSPADM

## 2018-02-05 RX ORDER — HYDROMORPHONE HYDROCHLORIDE 2 MG/ML
0.2 INJECTION, SOLUTION INTRAMUSCULAR; INTRAVENOUS; SUBCUTANEOUS EVERY 5 MIN PRN
Status: DISCONTINUED | OUTPATIENT
Start: 2018-02-05 | End: 2018-02-05 | Stop reason: HOSPADM

## 2018-02-05 RX ORDER — METOPROLOL TARTRATE 1 MG/ML
INJECTION, SOLUTION INTRAVENOUS
Status: DISCONTINUED | OUTPATIENT
Start: 2018-02-05 | End: 2018-02-05

## 2018-02-05 RX ORDER — GENTAMICIN SULFATE 40 MG/ML
INJECTION, SOLUTION INTRAMUSCULAR; INTRAVENOUS
Status: DISCONTINUED | OUTPATIENT
Start: 2018-02-05 | End: 2018-02-05 | Stop reason: HOSPADM

## 2018-02-05 RX ORDER — SODIUM CHLORIDE, SODIUM LACTATE, POTASSIUM CHLORIDE, CALCIUM CHLORIDE 600; 310; 30; 20 MG/100ML; MG/100ML; MG/100ML; MG/100ML
INJECTION, SOLUTION INTRAVENOUS CONTINUOUS PRN
Status: DISCONTINUED | OUTPATIENT
Start: 2018-02-05 | End: 2018-02-05

## 2018-02-05 RX ORDER — LIDOCAINE HCL/PF 100 MG/5ML
SYRINGE (ML) INTRAVENOUS
Status: DISCONTINUED | OUTPATIENT
Start: 2018-02-05 | End: 2018-02-05

## 2018-02-05 RX ORDER — CLINDAMYCIN PHOSPHATE 900 MG/50ML
900 INJECTION, SOLUTION INTRAVENOUS
Status: DISCONTINUED | OUTPATIENT
Start: 2018-02-05 | End: 2018-02-09 | Stop reason: HOSPADM

## 2018-02-05 RX ORDER — FENTANYL CITRATE 50 UG/ML
INJECTION, SOLUTION INTRAMUSCULAR; INTRAVENOUS
Status: DISCONTINUED | OUTPATIENT
Start: 2018-02-05 | End: 2018-02-05

## 2018-02-05 RX ORDER — OXYCODONE HYDROCHLORIDE 5 MG/1
10 TABLET ORAL
Status: DISCONTINUED | OUTPATIENT
Start: 2018-02-05 | End: 2018-02-09 | Stop reason: HOSPADM

## 2018-02-05 RX ORDER — SODIUM CHLORIDE 9 MG/ML
INJECTION, SOLUTION INTRAVENOUS CONTINUOUS
Status: DISCONTINUED | OUTPATIENT
Start: 2018-02-05 | End: 2018-02-09 | Stop reason: HOSPADM

## 2018-02-05 RX ORDER — ROCURONIUM BROMIDE 10 MG/ML
INJECTION, SOLUTION INTRAVENOUS
Status: DISCONTINUED | OUTPATIENT
Start: 2018-02-05 | End: 2018-02-05

## 2018-02-05 RX ORDER — PROPOFOL 10 MG/ML
VIAL (ML) INTRAVENOUS
Status: DISCONTINUED | OUTPATIENT
Start: 2018-02-05 | End: 2018-02-05

## 2018-02-05 RX ORDER — MORPHINE SULFATE 10 MG/ML
4 INJECTION, SOLUTION INTRAMUSCULAR; INTRAVENOUS
Status: DISCONTINUED | OUTPATIENT
Start: 2018-02-05 | End: 2018-02-06 | Stop reason: RX

## 2018-02-05 RX ADMIN — INSULIN ASPART 1 UNITS: 100 INJECTION, SOLUTION INTRAVENOUS; SUBCUTANEOUS at 10:02

## 2018-02-05 RX ADMIN — FAMOTIDINE 20 MG: 10 INJECTION, SOLUTION INTRAVENOUS at 08:02

## 2018-02-05 RX ADMIN — FENTANYL CITRATE 25 MCG: 50 INJECTION INTRAMUSCULAR; INTRAVENOUS at 03:02

## 2018-02-05 RX ADMIN — ROCURONIUM BROMIDE 5 MG: 10 INJECTION, SOLUTION INTRAVENOUS at 05:02

## 2018-02-05 RX ADMIN — AMLODIPINE BESYLATE 10 MG: 5 TABLET ORAL at 08:02

## 2018-02-05 RX ADMIN — METOPROLOL TARTRATE 3 MG: 1 INJECTION, SOLUTION INTRAVENOUS at 08:02

## 2018-02-05 RX ADMIN — HYDROMORPHONE HYDROCHLORIDE 0.2 MG: 2 INJECTION, SOLUTION INTRAMUSCULAR; INTRAVENOUS; SUBCUTANEOUS at 08:02

## 2018-02-05 RX ADMIN — CLINDAMYCIN IN 5 PERCENT DEXTROSE 900 MG: 18 INJECTION, SOLUTION INTRAVENOUS at 10:02

## 2018-02-05 RX ADMIN — DOCUSATE SODIUM AND SENNOSIDES 1 TABLET: 8.6; 5 TABLET, FILM COATED ORAL at 08:02

## 2018-02-05 RX ADMIN — LIDOCAINE HYDROCHLORIDE 60 MG: 20 INJECTION, SOLUTION INTRAVENOUS at 05:02

## 2018-02-05 RX ADMIN — INSULIN ASPART 2 UNITS: 100 INJECTION, SOLUTION INTRAVENOUS; SUBCUTANEOUS at 09:02

## 2018-02-05 RX ADMIN — Medication 3 ML: at 05:02

## 2018-02-05 RX ADMIN — Medication 1000 MG: at 05:02

## 2018-02-05 RX ADMIN — HYDROMORPHONE HYDROCHLORIDE 1 MG: 2 INJECTION, SOLUTION INTRAMUSCULAR; INTRAVENOUS; SUBCUTANEOUS at 01:02

## 2018-02-05 RX ADMIN — SODIUM CHLORIDE: 0.9 INJECTION, SOLUTION INTRAVENOUS at 10:02

## 2018-02-05 RX ADMIN — LISINOPRIL 20 MG: 20 TABLET ORAL at 08:02

## 2018-02-05 RX ADMIN — FAMOTIDINE 20 MG: 10 INJECTION, SOLUTION INTRAVENOUS at 10:02

## 2018-02-05 RX ADMIN — HYDROMORPHONE HYDROCHLORIDE 1 MG: 2 INJECTION, SOLUTION INTRAMUSCULAR; INTRAVENOUS; SUBCUTANEOUS at 12:02

## 2018-02-05 RX ADMIN — HYDROMORPHONE HYDROCHLORIDE 1 MG: 2 INJECTION, SOLUTION INTRAMUSCULAR; INTRAVENOUS; SUBCUTANEOUS at 08:02

## 2018-02-05 RX ADMIN — ONDANSETRON 4 MG: 2 INJECTION, SOLUTION INTRAMUSCULAR; INTRAVENOUS at 06:02

## 2018-02-05 RX ADMIN — FENTANYL CITRATE 50 MCG: 50 INJECTION INTRAMUSCULAR; INTRAVENOUS at 06:02

## 2018-02-05 RX ADMIN — PROPOFOL 100 MG: 10 INJECTION, EMULSION INTRAVENOUS at 05:02

## 2018-02-05 RX ADMIN — ROCURONIUM BROMIDE 15 MG: 10 INJECTION, SOLUTION INTRAVENOUS at 05:02

## 2018-02-05 RX ADMIN — LATANOPROST 1 DROP: 50 SOLUTION/ DROPS OPHTHALMIC at 10:02

## 2018-02-05 RX ADMIN — PROPOFOL 30 MG: 10 INJECTION, EMULSION INTRAVENOUS at 08:02

## 2018-02-05 RX ADMIN — FENTANYL CITRATE 50 MCG: 50 INJECTION INTRAMUSCULAR; INTRAVENOUS at 05:02

## 2018-02-05 RX ADMIN — INSULIN ASPART 2 UNITS: 100 INJECTION, SOLUTION INTRAVENOUS; SUBCUTANEOUS at 12:02

## 2018-02-05 RX ADMIN — SODIUM CHLORIDE, SODIUM LACTATE, POTASSIUM CHLORIDE, AND CALCIUM CHLORIDE: .6; .31; .03; .02 INJECTION, SOLUTION INTRAVENOUS at 03:02

## 2018-02-05 RX ADMIN — ACETAMINOPHEN 1000 MG: 10 INJECTION, SOLUTION INTRAVENOUS at 08:02

## 2018-02-05 RX ADMIN — METOPROLOL TARTRATE 25 MG: 25 TABLET, FILM COATED ORAL at 08:02

## 2018-02-05 RX ADMIN — SODIUM CHLORIDE, SODIUM LACTATE, POTASSIUM CHLORIDE, AND CALCIUM CHLORIDE: .6; .31; .03; .02 INJECTION, SOLUTION INTRAVENOUS at 08:02

## 2018-02-05 RX ADMIN — METOPROLOL TARTRATE 2 MG: 1 INJECTION, SOLUTION INTRAVENOUS at 08:02

## 2018-02-05 RX ADMIN — Medication 3 ML: at 10:02

## 2018-02-05 RX ADMIN — HYDROMORPHONE HYDROCHLORIDE 1 MG: 2 INJECTION, SOLUTION INTRAMUSCULAR; INTRAVENOUS; SUBCUTANEOUS at 04:02

## 2018-02-05 RX ADMIN — SUCCINYLCHOLINE CHLORIDE 100 MG: 20 INJECTION, SOLUTION INTRAMUSCULAR; INTRAVENOUS at 05:02

## 2018-02-05 RX ADMIN — HYDROMORPHONE HYDROCHLORIDE 1 MG: 2 INJECTION, SOLUTION INTRAMUSCULAR; INTRAVENOUS; SUBCUTANEOUS at 10:02

## 2018-02-05 RX ADMIN — PROPOFOL 20 MG: 10 INJECTION, EMULSION INTRAVENOUS at 08:02

## 2018-02-05 NOTE — PROGRESS NOTES
Progress Note    Admit Date: 2/3/2018   LOS: 2 days     SUBJECTIVE:     Follow-up For:  Left humerus fracture    02/05/2018: left UE pain moderate. Got it splinted yesterday. No fever or chills. +dysuria and constipation      Scheduled Meds:   amLODIPine  10 mg Oral Daily    famotidine (PF)  20 mg Intravenous Q12H    latanoprost  1 drop Both Eyes QHS    lisinopril  20 mg Oral Daily    metoprolol tartrate  25 mg Oral BID    senna-docusate 8.6-50 mg  1 tablet Oral BID    sodium chloride 0.9%  3 mL Intravenous Q8H     Continuous Infusions:  PRN Meds:acetaminophen, acetaminophen, dextrose 50%, dextrose 50%, glucagon (human recombinant), glucose, glucose, HYDROmorphone, HYDROmorphone, insulin aspart, ondansetron, ondansetron, ramelteon, vancomycin (VANCOCIN) IVPB    Review of patient's allergies indicates:   Allergen Reactions    Bactrim [sulfamethoxazole-trimethoprim]     Cephalexin Other (See Comments)     Leg cramps    Codeine Itching    Demerol [meperidine]     Etodolac Diarrhea    Metformin Diarrhea    Naproxen     Sulfur     Terbinafine     Darvocet a500 [propoxyphene n-acetaminophen] Rash       Review of Systems    Constitutional: + fatigue and moderate pain.  Eyes: no visual changes   ENT: no nasal congestion. Denies sore throat with odynophagia   Respiratory: No cough or sob    Cardiovascular: no chest pain or palpitations   Gastrointestinal: + constipation  Hematologic/Lymphatic: No lymphadenopathy, no significant fatigue, fever or night sweat. No mucocutaneous bleeding   Musculoskeletal: see HPI  Neurological: no seizures or tremors, gait or balance prblems  Skin: No rashes or lesions  Psych: chronic anxiety     OBJECTIVE:     Vital Signs (Most Recent)  Temp: 99.9 °F (37.7 °C) (02/05/18 0500)  Pulse: 101 (02/05/18 0500)  Resp: 12 (02/05/18 0500)  BP: (!) 107/53 (02/05/18 0500)  SpO2: 95 % (02/05/18 0500)    Vital Signs Range (Last 24H):  Temp:  [97.8 °F (36.6 °C)-99.9 °F (37.7 °C)]   Pulse:   []   Resp:  [12-20]   BP: (102-140)/(51-63)   SpO2:  [89 %-95 %]     I & O (Last 24H):  Intake/Output Summary (Last 24 hours) at 02/05/18 0805  Last data filed at 02/04/18 2100   Gross per 24 hour   Intake              360 ml   Output                0 ml   Net              360 ml     Physical Exam:    General: NAD, resting in bed.   Head: normocephalic, atraumatic   Eyes: conjunctivae pink, anicteric sclera.    Throat: No erythema or post nasal discharge   Neck: supple, no LAD   Lungs: decreased BS at the bases    Heart: S1, S2, RRR   Abdomen: + BS x 4 quadrants, soft, non tender. No hepatosplenomegaly.   Extremities: Left UE in splint and ace wrap. ROM limited    Skin: turgor normal, no erythema or rashes. No abnormal moles  MS: LUE ROM limited with pain.   Neuro: A&O x 2   Psy: calm      Laboratory:  CBC:   Recent Labs  Lab 02/05/18  0547   WBC 7.73   RBC 3.77*   HGB 11.8*   HCT 36.5*      MCV 97   MCH 31.3*   MCHC 32.3     CMP:   Recent Labs  Lab 02/05/18  0547   *   CALCIUM 9.5      K 4.7   CO2 26      BUN 31*   CREATININE 1.5*     Coagulation:   Recent Labs  Lab 02/03/18  1715   LABPROT 10.1   INR 1.0     Cardiac markers:   Recent Labs  Lab 02/04/18  0430   TROPONINI <0.006     ABGs: No results for input(s): PH, PCO2, PO2, HCO3, POCSATURATED, BE in the last 168 hours.  Microbiology Results (last 7 days)     ** No results found for the last 168 hours. **        Specimen (12h ago through future)    None        No results for input(s): COLORU, CLARITYU, SPECGRAV, PHUR, PROTEINUA, GLUCOSEU, BILIRUBINCON, BLOODU, WBCU, RBCU, BACTERIA, MUCUS, NITRITE, LEUKOCYTESUR, UROBILINOGEN, HYALINECASTS in the last 168 hours.    Diagnostic Results:      ASSESSMENT/PLAN:     Active Hospital Problems     Diagnosis   POA    *Closed fracture of left distal humerus [S42.402A]- after a slip and fall accident at daughter's home  - pt has acceptable cardiac risk for this medically necessary surgical  intervention     - in view of hypoxia and low bp- will  Get 2-D Echo and cardiology evaluation- pending      - continue Beta blocker tx      Yes    Acute pain [R52]  - hydromorphone prn   Yes    Fall [W19.XXXA]   Yes    Hypoxia [R09.02]  - cxr no acute pathology     - 2- D Echo    Yes    Osteoporosis, postmenopausal [M81.0]   Yes    Diabetes mellitus, type II [E11.9]     - HgA1c is not ideal range for elective procedure, but given  Her current acute event- ok to proceed with surgery  - may need insulin drip post op      Yes       Resolved Hospital Problems     Diagnosis Date Resolved POA   No resolved problems to display.      DVT prophylaxis: SCDs     Dysuria: check UA, C&S    Hector Saleh M.D  Internal Medicine & Geriatric Medicine  Hematology & Oncology  Palliative Medicine    [address], [address]  [place] [address]  [phone] (Office)  [phone] (Fax)

## 2018-02-05 NOTE — PLAN OF CARE
Problem: Patient Care Overview  Goal: Plan of Care Review  Outcome: Ongoing (interventions implemented as appropriate)  Patient remains free from injury and falls. Left arm kept elevated. Pain controlled with PRN medication. Turned every 2 hours. Patient with some episodes of incontinence and voids per bedpan at times. Some confusion during shift. Easily redirected. O2 sats remain WNL on 2 liters nasal cannular. Neurovascular checks performed on left arm. Capillary refill intact, no discoloration. Denies numbness and tingling. Patient planned to have surgery late today. Plan of care continued.

## 2018-02-05 NOTE — PLAN OF CARE
02/05/18 1033   Discharge Reassessment   Assessment Type Discharge Planning Reassessment   Do you have any problems affording any of your prescribed medications? No   Discharge Plan A Skilled Nursing Facility  (Pt getting Surgery on today for Arm will possibly need SNF once surgery is completed. Spoke with [facility], stated that pt will be accepted as SNF once discharged. SW voiced uncerstanding. )   Discharge Plan B Return to Nursing Home   Can the patient answer the patient profile reliably? Yes, cognitively intact   How does the patient rate their overall health at the present time? Fair   Describe the patient's ability to walk at the present time. Minor restrictions or changes   How often would a person be available to care for the patient? Often   Number of comorbid conditions (as recorded on the chart) Five or more

## 2018-02-06 LAB
ANION GAP SERPL CALC-SCNC: 10 MMOL/L
AORTIC VALVE STENOSIS: ABNORMAL
BASOPHILS # BLD AUTO: 0.04 K/UL
BASOPHILS NFR BLD: 0.3 %
BUN SERPL-MCNC: 35 MG/DL
CALCIUM SERPL-MCNC: 8.9 MG/DL
CHLORIDE SERPL-SCNC: 106 MMOL/L
CO2 SERPL-SCNC: 25 MMOL/L
CREAT SERPL-MCNC: 1.1 MG/DL
DIASTOLIC DYSFUNCTION: NO
DIFFERENTIAL METHOD: ABNORMAL
EOSINOPHIL # BLD AUTO: 0 K/UL
EOSINOPHIL NFR BLD: 0.3 %
ERYTHROCYTE [DISTWIDTH] IN BLOOD BY AUTOMATED COUNT: 13.3 %
EST. GFR  (AFRICAN AMERICAN): 53 ML/MIN/1.73 M^2
EST. GFR  (NON AFRICAN AMERICAN): 46 ML/MIN/1.73 M^2
ESTIMATED PA SYSTOLIC PRESSURE: 29.21
GLOBAL PERICARDIAL EFFUSION: ABNORMAL
GLUCOSE SERPL-MCNC: 198 MG/DL
HCT VFR BLD AUTO: 35.3 %
HGB BLD-MCNC: 11.6 G/DL
LYMPHOCYTES # BLD AUTO: 2.3 K/UL
LYMPHOCYTES NFR BLD: 19.2 %
MCH RBC QN AUTO: 31.3 PG
MCHC RBC AUTO-ENTMCNC: 32.9 G/DL
MCV RBC AUTO: 95 FL
MONOCYTES # BLD AUTO: 2.2 K/UL
MONOCYTES NFR BLD: 18.6 %
NEUTROPHILS # BLD AUTO: 7.2 K/UL
NEUTROPHILS NFR BLD: 61.9 %
PLATELET # BLD AUTO: 222 K/UL
PMV BLD AUTO: 9.7 FL
POCT GLUCOSE: 180 MG/DL (ref 70–110)
POCT GLUCOSE: 196 MG/DL (ref 70–110)
POCT GLUCOSE: 200 MG/DL (ref 70–110)
POCT GLUCOSE: 224 MG/DL (ref 70–110)
POTASSIUM SERPL-SCNC: 5.5 MMOL/L
RBC # BLD AUTO: 3.71 M/UL
RETIRED EF AND QEF - SEE NOTES: 55 (ref 55–65)
SODIUM SERPL-SCNC: 141 MMOL/L
TOXIC GRANULES BLD QL SMEAR: PRESENT
TRICUSPID VALVE REGURGITATION: ABNORMAL
WBC # BLD AUTO: 11.73 K/UL

## 2018-02-06 PROCEDURE — 93306 TTE W/DOPPLER COMPLETE: CPT | Mod: 26,,, | Performed by: INTERNAL MEDICINE

## 2018-02-06 PROCEDURE — 93306 TTE W/DOPPLER COMPLETE: CPT

## 2018-02-06 PROCEDURE — 97530 THERAPEUTIC ACTIVITIES: CPT

## 2018-02-06 PROCEDURE — 94761 N-INVAS EAR/PLS OXIMETRY MLT: CPT

## 2018-02-06 PROCEDURE — A4216 STERILE WATER/SALINE, 10 ML: HCPCS | Performed by: EMERGENCY MEDICINE

## 2018-02-06 PROCEDURE — G8978 MOBILITY CURRENT STATUS: HCPCS | Mod: CM

## 2018-02-06 PROCEDURE — 85025 COMPLETE CBC W/AUTO DIFF WBC: CPT

## 2018-02-06 PROCEDURE — 97165 OT EVAL LOW COMPLEX 30 MIN: CPT

## 2018-02-06 PROCEDURE — 63600175 PHARM REV CODE 636 W HCPCS: Performed by: EMERGENCY MEDICINE

## 2018-02-06 PROCEDURE — 25000003 PHARM REV CODE 250: Performed by: EMERGENCY MEDICINE

## 2018-02-06 PROCEDURE — S0028 INJECTION, FAMOTIDINE, 20 MG: HCPCS | Performed by: EMERGENCY MEDICINE

## 2018-02-06 PROCEDURE — 97162 PT EVAL MOD COMPLEX 30 MIN: CPT

## 2018-02-06 PROCEDURE — 36415 COLL VENOUS BLD VENIPUNCTURE: CPT

## 2018-02-06 PROCEDURE — 80048 BASIC METABOLIC PNL TOTAL CA: CPT

## 2018-02-06 PROCEDURE — 25000003 PHARM REV CODE 250: Performed by: ORTHOPAEDIC SURGERY

## 2018-02-06 PROCEDURE — 25000003 PHARM REV CODE 250: Performed by: INTERNAL MEDICINE

## 2018-02-06 PROCEDURE — 11000001 HC ACUTE MED/SURG PRIVATE ROOM

## 2018-02-06 PROCEDURE — S0077 INJECTION, CLINDAMYCIN PHOSP: HCPCS | Performed by: ORTHOPAEDIC SURGERY

## 2018-02-06 PROCEDURE — 99223 1ST HOSP IP/OBS HIGH 75: CPT | Mod: ,,, | Performed by: INTERNAL MEDICINE

## 2018-02-06 PROCEDURE — 27000221 HC OXYGEN, UP TO 24 HOURS

## 2018-02-06 PROCEDURE — G8979 MOBILITY GOAL STATUS: HCPCS | Mod: CK

## 2018-02-06 RX ORDER — CIPROFLOXACIN 500 MG/1
500 TABLET ORAL EVERY 12 HOURS
Status: DISCONTINUED | OUTPATIENT
Start: 2018-02-06 | End: 2018-02-09 | Stop reason: HOSPADM

## 2018-02-06 RX ORDER — HYDROMORPHONE HYDROCHLORIDE 2 MG/ML
0.5 INJECTION, SOLUTION INTRAMUSCULAR; INTRAVENOUS; SUBCUTANEOUS EVERY 4 HOURS PRN
Status: DISCONTINUED | OUTPATIENT
Start: 2018-02-06 | End: 2018-02-09 | Stop reason: HOSPADM

## 2018-02-06 RX ADMIN — CLINDAMYCIN IN 5 PERCENT DEXTROSE 900 MG: 18 INJECTION, SOLUTION INTRAVENOUS at 11:02

## 2018-02-06 RX ADMIN — FAMOTIDINE 20 MG: 10 INJECTION, SOLUTION INTRAVENOUS at 09:02

## 2018-02-06 RX ADMIN — LATANOPROST 1 DROP: 50 SOLUTION/ DROPS OPHTHALMIC at 09:02

## 2018-02-06 RX ADMIN — FAMOTIDINE 20 MG: 10 INJECTION, SOLUTION INTRAVENOUS at 08:02

## 2018-02-06 RX ADMIN — LISINOPRIL 20 MG: 20 TABLET ORAL at 08:02

## 2018-02-06 RX ADMIN — INSULIN ASPART 2 UNITS: 100 INJECTION, SOLUTION INTRAVENOUS; SUBCUTANEOUS at 08:02

## 2018-02-06 RX ADMIN — METOPROLOL TARTRATE 25 MG: 25 TABLET, FILM COATED ORAL at 09:02

## 2018-02-06 RX ADMIN — CLINDAMYCIN IN 5 PERCENT DEXTROSE 900 MG: 18 INJECTION, SOLUTION INTRAVENOUS at 06:02

## 2018-02-06 RX ADMIN — DOCUSATE SODIUM 100 MG: 100 CAPSULE, LIQUID FILLED ORAL at 08:02

## 2018-02-06 RX ADMIN — HYDROMORPHONE HYDROCHLORIDE 1 MG: 2 INJECTION, SOLUTION INTRAMUSCULAR; INTRAVENOUS; SUBCUTANEOUS at 04:02

## 2018-02-06 RX ADMIN — DOCUSATE SODIUM 100 MG: 100 CAPSULE, LIQUID FILLED ORAL at 09:02

## 2018-02-06 RX ADMIN — SODIUM CHLORIDE: 0.9 INJECTION, SOLUTION INTRAVENOUS at 02:02

## 2018-02-06 RX ADMIN — Medication 3 ML: at 09:02

## 2018-02-06 RX ADMIN — OXYCODONE HYDROCHLORIDE 10 MG: 5 TABLET ORAL at 09:02

## 2018-02-06 RX ADMIN — METOPROLOL TARTRATE 25 MG: 25 TABLET, FILM COATED ORAL at 08:02

## 2018-02-06 RX ADMIN — AMLODIPINE BESYLATE 10 MG: 5 TABLET ORAL at 08:02

## 2018-02-06 RX ADMIN — CLINDAMYCIN IN 5 PERCENT DEXTROSE 900 MG: 18 INJECTION, SOLUTION INTRAVENOUS at 02:02

## 2018-02-06 RX ADMIN — CIPROFLOXACIN HYDROCHLORIDE 500 MG: 500 TABLET, FILM COATED ORAL at 09:02

## 2018-02-06 RX ADMIN — OXYCODONE HYDROCHLORIDE 10 MG: 5 TABLET ORAL at 01:02

## 2018-02-06 NOTE — PLAN OF CARE
[facility]    FACILITY TRANSFER ORDERS      Patient Name: Ngoc Henderson  YOB: 1931    PCP: Hector Saleh MD   PCP Address: [facility] [address] [facility] [address] / [address]  PCP Phone Number: [phone]  PCP Fax: [phone]    Encounter Date: 02/08/2018    Admit to: [facility]     Vital Signs: Per Facility Protocol     Diagnoses:   Active Hospital Problems    Diagnosis  POA    *Closed fracture of left distal humerus [S42.402A]  Yes    Acute pain [R52]  Yes    Fall [W19.XXXA]  Yes    Hypoxia [R09.02]  Yes    Osteoporosis, postmenopausal [M81.0]  Yes    Diabetes mellitus, type II [E11.9]  Yes      Resolved Hospital Problems    Diagnosis Date Resolved POA   No resolved problems to display.       Allergies:  Review of patient's allergies indicates:   Allergen Reactions    Bactrim [sulfamethoxazole-trimethoprim]     Cephalexin Other (See Comments)     Leg cramps    Codeine Itching    Demerol [meperidine]     Etodolac Diarrhea    Metformin Diarrhea    Naproxen     Sulfur     Terbinafine     Darvocet a500 [propoxyphene n-acetaminophen] Rash       Diet: Diabetic 1800 Calorie     Activities: Ambulate with assistance     Nursing: Neuro checks q 4 hours     Labs: Per facility Protocol       Medications: Review discharge medications with patient and family and provide education.      Current Discharge Medication List      CONTINUE these medications which have NOT CHANGED    Details   amlodipine (NORVASC) 10 MG tablet Take 1 tablet (10 mg total) by mouth once daily.  Qty: 30 tablet, Refills: 11    Associated Diagnoses: Essential hypertension      atorvastatin (LIPITOR) 40 MG tablet Take 40 mg by mouth once daily.      canagliflozin (INVOKANA) 300 mg Tab tablet Take 300 mg by mouth once daily.      diphenhydrAMINE (BENADRYL) 25 mg capsule Take 25 mg by mouth every 6 (six) hours as needed for Itching.      docusate sodium (COLACE) 100 MG capsule Take 100 mg by mouth 2 (two)  times daily.      escitalopram oxalate (LEXAPRO) 10 MG tablet Take 1 tablet (10 mg total) by mouth once daily.  Qty: 30 tablet, Refills: 2    Associated Diagnoses: Anxiety disorder due to general medical condition; Depressed mood      gabapentin (NEURONTIN) 100 MG capsule Take 100 mg by mouth 3 (three) times daily.      hydrocodone-acetaminophen 5-325mg (NORCO) 5-325 mg per tablet Take 1 tablet by mouth every 12 (twelve) hours as needed for Pain. Severe pain  Qty: 5 tablet, Refills: 0      insulin aspart (NOVOLOG) 100 unit/mL injection Inject 20 Units into the skin 3 (three) times daily before meals.      LANTUS SOLOSTAR 100 unit/mL (3 mL) InPn pen inject 32 UNITS IN THE MORNING  Qty: 9 mL, Refills: 11    Associated Diagnoses: Uncontrolled type 2 diabetes mellitus with diabetic neuropathy, with long-term current use of insulin      latanoprost (XALATAN) 0.005 % ophthalmic solution Place 1 drop into both eyes every evening.        linaclotide (LINZESS) 72 mcg Cap Take by mouth.      !! lisinopril (PRINIVIL,ZESTRIL) 20 MG tablet Take 20 mg by mouth once daily.      metoprolol tartrate (LOPRESSOR) 25 MG tablet Take 1 tablet (25 mg total) by mouth 2 (two) times daily.  Qty: 60 tablet, Refills: 11    Associated Diagnoses: Essential hypertension      omeprazole (PRILOSEC) 40 MG capsule Take 40 mg by mouth once daily.      !! ACCU-CHEK [facility] [facility] Refills: 5      alendronate (FOSAMAX) 70 MG tablet Refills: 6      !! blood sugar diagnostic (ACCU-CHEK SMARTVIEW TEST STRIP) Strp use with insulin AS DIRECTED as directed  Qty: 100 strip, Refills: 7    Associated Diagnoses: Type 2 diabetes mellitus without complication      !! blood sugar diagnostic Strp test TWO TO THREE TIMES DAILY  Refills: 0      blood-glucose meter (ACCU-CHEK DORCAS) Misc Please provide glucometer covered by the insurance company  Qty: 1 each, Refills: 1    Associated Diagnoses: DM type 2, uncontrolled, with neuropathy      clindamycin (CLEOCIN) 150 MG  "capsule Take 2 capsules (300 mg total) by mouth every 6 (six) hours.  Qty: 56 capsule, Refills: 0    Associated Diagnoses: Wound cellulitis      desonide 0.05% (DESOWEN) 0.05 % Oint VICTOR HUGO AA BID RASH  Refills: 1      dextromethorphan-guaifenesin  mg (MUCINEX DM)  mg per 12 hr tablet Take 1 tablet by mouth every 12 (twelve) hours.      !! EASY TOUCH TWIST LANCETS 32 gauge Misc test DAILY AS DIRECTED  Refills: 7      fluconazole (DIFLUCAN) 200 MG Tab Take 1 tablet (200 mg total) by mouth once daily.  Qty: 2 tablet, Refills: 0      fluocinonide (LIDEX) 0.05 % ointment 1 application 2 (two) times daily. Apply to affected area  Refills: 0      !! FREESTYLE LANCETS 28 gauge lancets USE DAILY  Qty: 100 each, Refills: 5    Associated Diagnoses: Type II or unspecified type diabetes mellitus without mention of complication, not stated as uncontrolled      glipiZIDE (GLUCOTROL) 10 MG tablet Take 1 tablet (10 mg total) by mouth 2 (two) times daily with meals.  Qty: 180 tablet, Refills: 3    Associated Diagnoses: Type 2 diabetes mellitus with diabetic polyneuropathy, with long-term current use of insulin      !! insulin needles, disposable, (NOVOFINE 32) 32 x 1/4 " Ndle Inject 1 Units into the skin once daily.  Qty: 100 each, Refills: 1    Associated Diagnoses: Diabetes mellitus, type II      !! lancets (FREESTYLE LANCETS) 28 gauge Misc Inject 1 lancet into the skin once daily.  Qty: 100 each, Refills: 1      !! lisinopril (PRINIVIL,ZESTRIL) 40 MG tablet Take 1 tablet (40 mg total) by mouth once daily.  Qty: 90 tablet, Refills: 3    Associated Diagnoses: Essential hypertension      miconazole (MICOTIN) 2 % cream Apply topically 2 (two) times daily.  Qty: 30 g, Refills: 0      naproxen (NAPROSYN) 500 MG tablet Take 1 tablet (500 mg total) by mouth 2 (two) times daily with meals.  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      !! NOVOFINE 32 32 gauge x 1/4" Ndle Inject 1 Units into the skin once daily.  Qty: 100 each, Refills: 0    Associated Diagnoses: Diabetes mellitus, type II      !! NOVOFINE 32 32 gauge x 1/4" Ndle Inject 1 Units into the skin once daily.  Qty: 100 each, Refills: 1    Associated Diagnoses: Diabetes mellitus, type II      nystatin (MYCOSTATIN) ointment Apply topically 2 (two) times daily.  Qty: 15 g, Refills: 0      polyethylene glycol (GLYCOLAX) 17 gram/dose powder Refills: 0      simvastatin (ZOCOR) 40 MG tablet TAKE 1 TABLET BY MOUTH EVERY EVENING  Qty: 30 tablet, Refills: 1    Associated Diagnoses: Hyperlipidemia LDL goal <100      tizanidine 2 mg Cap Take by mouth.      triamcinolone acetonide 0.1% (KENALOG) 0.1 % ointment Refills: 0      !! TRUE METRIX GLUCOSE TEST STRIP Strp TEST TWO TO THREE TIMES DAILY  Qty: 50 strip, Refills: 9    Associated Diagnoses: Uncontrolled type 2 diabetes mellitus with diabetic polyneuropathy, with long-term current use of insulin      !! TRUEPLUS LANCETS 30 gauge Misc TEST TWO TO THREE TIMES DAILY  Refills: 0       !! - Potential duplicate medications found. Please discuss with provider.               _________________________________  Hector Saleh MD   02/08/2018        "

## 2018-02-06 NOTE — PLAN OF CARE
[facility]    FACILITY TRANSFER ORDERS      Patient Name: Ngoc Henderson  YOB: 1931    PCP: Hector Saleh MD   PCP [facility] [address] [facility] [address] / [address]  PCP Phone Number: [phone]  PCP Fax: [phone]    Encounter Date: 02/06/2018    Admit to: [facility]     Vital Signs: Per Facility Protocol     Diagnoses:   Active Hospital Problems    Diagnosis  POA    *Closed fracture of left distal humerus [S42.402A]  Yes    Acute pain [R52]  Yes    Fall [W19.XXXA]  Yes    Hypoxia [R09.02]  Yes    Osteoporosis, postmenopausal [M81.0]  Yes    Diabetes mellitus, type II [E11.9]  Yes      Resolved Hospital Problems    Diagnosis Date Resolved POA   No resolved problems to display.       Allergies:  Review of patient's allergies indicates:   Allergen Reactions    Bactrim [sulfamethoxazole-trimethoprim]     Cephalexin Other (See Comments)     Leg cramps    Codeine Itching    Demerol [meperidine]     Etodolac Diarrhea    Metformin Diarrhea    Naproxen     Sulfur     Terbinafine     Darvocet a500 [propoxyphene n-acetaminophen] Rash       Diet: Diabetic 1800 Calorie     Activities: Ambulate with assistance     Nursing: Neuro checks q 4 hours     Labs: Per facility Protocol       Medications: Review discharge medications with patient and family and provide education.      Current Discharge Medication List      CONTINUE these medications which have NOT CHANGED    Details   amlodipine (NORVASC) 10 MG tablet Take 1 tablet (10 mg total) by mouth once daily.  Qty: 30 tablet, Refills: 11    Associated Diagnoses: Essential hypertension      atorvastatin (LIPITOR) 40 MG tablet Take 40 mg by mouth once daily.      canagliflozin (INVOKANA) 300 mg Tab tablet Take 300 mg by mouth once daily.      diphenhydrAMINE (BENADRYL) 25 mg capsule Take 25 mg by mouth every 6 (six) hours as needed for Itching.      docusate sodium (COLACE) 100 MG capsule Take 100 mg by mouth 2 (two)  times daily.      escitalopram oxalate (LEXAPRO) 10 MG tablet Take 1 tablet (10 mg total) by mouth once daily.  Qty: 30 tablet, Refills: 2    Associated Diagnoses: Anxiety disorder due to general medical condition; Depressed mood      gabapentin (NEURONTIN) 100 MG capsule Take 100 mg by mouth 3 (three) times daily.      hydrocodone-acetaminophen 5-325mg (NORCO) 5-325 mg per tablet Take 1 tablet by mouth every 12 (twelve) hours as needed for Pain. Severe pain  Qty: 5 tablet, Refills: 0      insulin aspart (NOVOLOG) 100 unit/mL injection Inject 20 Units into the skin 3 (three) times daily before meals.      LANTUS SOLOSTAR 100 unit/mL (3 mL) InPn pen inject 32 UNITS IN THE MORNING  Qty: 9 mL, Refills: 11    Associated Diagnoses: Uncontrolled type 2 diabetes mellitus with diabetic neuropathy, with long-term current use of insulin      latanoprost (XALATAN) 0.005 % ophthalmic solution Place 1 drop into both eyes every evening.        linaclotide (LINZESS) 72 mcg Cap Take by mouth.      !! lisinopril (PRINIVIL,ZESTRIL) 20 MG tablet Take 20 mg by mouth once daily.      metoprolol tartrate (LOPRESSOR) 25 MG tablet Take 1 tablet (25 mg total) by mouth 2 (two) times daily.  Qty: 60 tablet, Refills: 11    Associated Diagnoses: Essential hypertension      omeprazole (PRILOSEC) 40 MG capsule Take 40 mg by mouth once daily.      !! ACCU-CHEK FASTCLIX [facility] Refills: 5      alendronate (FOSAMAX) 70 MG tablet Refills: 6      !! blood sugar diagnostic (ACCU-CHEK SMARTVIEW TEST STRIP) Strp use with insulin AS DIRECTED as directed  Qty: 100 strip, Refills: 7    Associated Diagnoses: Type 2 diabetes mellitus without complication      !! blood sugar diagnostic Strp test TWO TO THREE TIMES DAILY  Refills: 0      blood-glucose meter (ACCU-CHEK DORCAS) Misc Please provide glucometer covered by the insurance company  Qty: 1 each, Refills: 1    Associated Diagnoses: DM type 2, uncontrolled, with neuropathy      clindamycin (CLEOCIN) 150 MG  "capsule Take 2 capsules (300 mg total) by mouth every 6 (six) hours.  Qty: 56 capsule, Refills: 0    Associated Diagnoses: Wound cellulitis      desonide 0.05% (DESOWEN) 0.05 % Oint VICTOR HUGO AA BID RASH  Refills: 1      dextromethorphan-guaifenesin  mg (MUCINEX DM)  mg per 12 hr tablet Take 1 tablet by mouth every 12 (twelve) hours.      !! EASY TOUCH TWIST LANCETS 32 gauge Misc test DAILY AS DIRECTED  Refills: 7      fluconazole (DIFLUCAN) 200 MG Tab Take 1 tablet (200 mg total) by mouth once daily.  Qty: 2 tablet, Refills: 0      fluocinonide (LIDEX) 0.05 % ointment 1 application 2 (two) times daily. Apply to affected area  Refills: 0      !! FREESTYLE LANCETS 28 gauge lancets USE DAILY  Qty: 100 each, Refills: 5    Associated Diagnoses: Type II or unspecified type diabetes mellitus without mention of complication, not stated as uncontrolled      glipiZIDE (GLUCOTROL) 10 MG tablet Take 1 tablet (10 mg total) by mouth 2 (two) times daily with meals.  Qty: 180 tablet, Refills: 3    Associated Diagnoses: Type 2 diabetes mellitus with diabetic polyneuropathy, with long-term current use of insulin      !! insulin needles, disposable, (NOVOFINE 32) 32 x 1/4 " Ndle Inject 1 Units into the skin once daily.  Qty: 100 each, Refills: 1    Associated Diagnoses: Diabetes mellitus, type II      !! lancets (FREESTYLE LANCETS) 28 gauge Misc Inject 1 lancet into the skin once daily.  Qty: 100 each, Refills: 1      !! lisinopril (PRINIVIL,ZESTRIL) 40 MG tablet Take 1 tablet (40 mg total) by mouth once daily.  Qty: 90 tablet, Refills: 3    Associated Diagnoses: Essential hypertension      miconazole (MICOTIN) 2 % cream Apply topically 2 (two) times daily.  Qty: 30 g, Refills: 0      naproxen (NAPROSYN) 500 MG tablet Take 1 tablet (500 mg total) by mouth 2 (two) times daily with meals.  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      !! NOVOFINE 32 32 gauge x 1/4" Ndle Inject 1 Units into the skin once daily.  Qty: 100 each, Refills: 0    Associated Diagnoses: Diabetes mellitus, type II      !! NOVOFINE 32 32 gauge x 1/4" Ndle Inject 1 Units into the skin once daily.  Qty: 100 each, Refills: 1    Associated Diagnoses: Diabetes mellitus, type II      nystatin (MYCOSTATIN) ointment Apply topically 2 (two) times daily.  Qty: 15 g, Refills: 0      polyethylene glycol (GLYCOLAX) 17 gram/dose powder Refills: 0      simvastatin (ZOCOR) 40 MG tablet TAKE 1 TABLET BY MOUTH EVERY EVENING  Qty: 30 tablet, Refills: 1    Associated Diagnoses: Hyperlipidemia LDL goal <100      tizanidine 2 mg Cap Take by mouth.      triamcinolone acetonide 0.1% (KENALOG) 0.1 % ointment Refills: 0      !! TRUE METRIX GLUCOSE TEST STRIP Strp TEST TWO TO THREE TIMES DAILY  Qty: 50 strip, Refills: 9    Associated Diagnoses: Uncontrolled type 2 diabetes mellitus with diabetic polyneuropathy, with long-term current use of insulin      !! TRUEPLUS LANCETS 30 gauge Misc TEST TWO TO THREE TIMES DAILY  Refills: 0       !! - Potential duplicate medications found. Please discuss with provider.               _________________________________  Hector Saleh MD   02/06/2018        "

## 2018-02-06 NOTE — PLAN OF CARE
Problem: Patient Care Overview  Goal: Plan of Care Review  Outcome: Ongoing (interventions implemented as appropriate)  Pt free of accidental injury during shift. No s/s of distress noted. No complaints of pain at this time. Only with movement. S/P ORIF to Left arm. Arm in sling and elevated on pillow. SCDs in place. IVF in progress. Poor appetite. Safety measures maintained. Call bell within reach. Will continue to monitor

## 2018-02-06 NOTE — PLAN OF CARE
Problem: Patient Care Overview  Goal: Plan of Care Review  Outcome: Ongoing (interventions implemented as appropriate)  Patient remains free from injury and falls this shift. S/P ORIF last night. Left arm in sling with cast. Patient awakens to voice. Complains of pain with repositioning. Pain controlled with PRN medication. IVF infusing. Turned every 2 hours. TEDS and SCDS on. Neurovascular checks performed. Urine specimen collected and sent to lab. Reorientation provided as needed through shift. Plan of care continued.

## 2018-02-06 NOTE — PT/OT/SLP EVAL
Physical Therapy Evaluation    Patient Name:  Ngoc Henderson   MRN:  [id number]    Recommendations:     Discharge Recommendations:  nursing facility, skilled   Discharge Equipment Recommendations:  (TBD at NH)   Barriers to discharge: None    Assessment:     Ngoc Henderson is a 86 y.o. female admitted with a medical diagnosis of Closed fracture of left distal humerus.  She presents with the following impairments/functional limitations:  weakness, impaired endurance, impaired self care skills, impaired functional mobilty, impaired balance, impaired cognition, decreased coordination, decreased upper extremity function, decreased lower extremity function, decreased safety awareness, pain, decreased ROM, impaired skin, edema, impaired cardiopulmonary response to activity, orthopedic precautions.    Rehab Prognosis:  fair; patient would benefit from acute skilled PT services to address these deficits and reach maximum level of function.      Recent Surgery: Procedure(s) (LRB):  OPEN REDUCTION INTERNAL FIXATION-HUMERUS  Superconduit (Left) 1 Day Post-Op    Plan:     During this hospitalization, patient to be seen daily to address the above listed problems via gait training, therapeutic activities, therapeutic exercises, wheelchair management/training  · Plan of Care Expires:  02/20/18   Plan of Care Reviewed with: patient, caregiver    Subjective     Communicated with nurse Rodriguez prior to session.  Patient found R sidelying upon PT entry to room, agreeable to evaluation.      Chief Complaint: pain with movement  Patient comments/goals: Pt wanted to get back in bed.   Pain/Comfort:  · Pain Rating 1:  (Pt only c/o pain in LUE with movement. )  · Pain Addressed 1: Pre-medicate for activity, Reposition, Distraction, Cessation of Activity    Living Environment:  Pt is a resident at [facility] ~1 year.  Pt was visiting her daughter and had a fall at daughter's home.   Prior to admission, patients level of function was mod I  with rollator for ambulation.  Patient has the following equipment: rollator.  Upon discharge, patient will have assistance from NH.    Objective:     Patient found with: oxygen, peripheral IV     General Precautions: Standard, fall, diabetic   Orthopedic Precautions:LUE non weight bearing   Braces: UE Sling     Exams:  · Cognitive Exam:  Patient is oriented to person only.  Pt kept her eyes closed throughout examination.  Pt was not engaging in conversation, mostly screamed with movement 2* LUE pain.  · Gross Motor Coordination:  impaired  · Postural Exam:  Patient presented with the following abnormalities:    · -       Rounded shoulders  · -       Forward head  · Skin Integrity/Edema:      · -       Skin integrity: Visible skin intact and LUE dressing intact; scab to L knee  · -       Edema: Mild LUE  · RLE ROM: WFL  · RLE Strength: WFL  · LLE ROM: WFL  · LLE Strength: WFL    Functional Mobility:  · Bed Mobility:     · Rolling Left:  dependence and of 2 persons  · Rolling Right: dependence and of 2 persons  · Scooting: dependence and of 2 persons  · Bridging: dependence and of 2 persons  · Supine to Sit: dependence and of 2 persons  · Sit to Supine: dependence and of 2 persons  · Balance: Pt with fair static sit balance.     AM-PAC 6 CLICK MOBILITY  Total Score:8     Therapeutic Activities and Exercises:  Pt's daughter educated on encouraging pt to at least perform B AP's while in the bed.  Daughter verbalized understanding.     Patient left right sidelying and LUE/BLE elevated on pillows with all lines intact, call button in reach, bed alarm on and daughter present.  SCD placed.     GOALS:    Physical Therapy Goals        Problem: Physical Therapy Goal    Goal Priority Disciplines Outcome Goal Variances Interventions   Physical Therapy Goal     PT/OT, PT      Description:  Goals to be met by: 18     Patient will increase functional independence with mobility by performin. Supine to sit with Moderate  Assistance  2. Sit to supine with Moderate Assistance  3. Rolling to Left and Right with Moderate Assistance  4. Sitting at edge of bed x 30 minutes with Stand-by Assistance  5. Lower extremity exercise program x 20 reps per handout, with SBA                      History:     Past Medical History:   Diagnosis Date    Cataract     Diabetes mellitus type II     Glaucoma     Hyperlipidemia     Hypertension     Osteoporosis        Past Surgical History:   Procedure Laterality Date    CHOLECYSTECTOMY      excision left axillary mass      excision thigh masses      EYE SURGERY      HEMORRHOID SURGERY      HYSTERECTOMY      VULVECTOMY         Clinical Decision Making:     History  Co-morbidities and personal factors that may impact the plan of care Examination  Body Structures and Functions, activity limitations and participation restrictions that may impact the plan of care Clinical Presentation   Decision Making/ Complexity Score   Co-morbidities:   [] Time since onset of injury / illness / exacerbation  [x] Status of current condition  [x]Patient's cognitive status and safety concerns    [] Multiple Medical Problems (see med hx)  Personal Factors:   [x] Patient's age  [] Prior Level of function   [] Patient's home situation (environment and family support)  [x] Patient's level of motivation  [x] Expected progression of patient      HISTORY:(criteria)    [] 48484 - no personal factors/history    [] 47422 - has 1-2 personal factor/comorbidity     [x] 92450 - has >3 personal factor/comorbidity     Body Regions:  [] Objective examination findings  [] Head     []  Neck  [] Trunk   [x] Upper Extremity  [] Lower Extremity    Body Systems:  [x] For communication ability, affect, cognition, language, and learning style: the assessment of the ability to make needs known, consciousness, orientation (person, place, and time), expected emotional /behavioral responses, and learning preferences (eg, learning barriers,  education  needs)  [x] For the neuromuscular system: a general assessment of gross coordinated movement (eg, balance, gait, locomotion, transfers, and transitions) and motor function  (motor control and motor learning)  [x] For the musculoskeletal system: the assessment of gross symmetry, gross range of motion, gross strength, height, and weight  [x] For the integumentary system: the assessment of pliability(texture), presence of scar formation, skin color, and skin integrity  [x] For cardiovascular/pulmonary system: the assessment of heart rate, respiratory rate, blood pressure, and edema     Activity limitations:    [x] Patient's cognitive status and saf ety concerns          [x] Status of current condition      [x] Weight bearing restriction  [x] Cardiopulmunary Restriction    Participation Restrictions:   [x] Goals and goal agreement with the patient     [x] Rehab potential (prognosis) and probable outcome      Examination of Body System: (criteria)    [] 03767 - addressing 1-2 elements    [] 00340 - addressing a total of 3 or more elements     [x] 47337 -  Addressing a total of 4 or more elements         Clinical Presentation: (criteria)  Evolving - 81371     On examination of body system using standardized tests and measures patient presents with 4 or more elements from any of the following: body structures and functions, activity limitations, and/or participation restrictions.  Leading to a clinical presentation that is considered evolving with changing characteristics                              Clinical Decision Making  (Eval Complexity):  Moderate - 02617     Time Tracking:     PT Received On: 02/06/18  PT Start Time: 1106     PT Stop Time: 1129  PT Total Time (min): 23 min     Billable Minutes: Evaluation 23 min co-eval with OT and Therapeutic Activity 8 min   3536-5415 8 min: PT attempted eval earlier this AM.  Upon PT arrival, pt requested bed pan for BM.  PT assisted pt with repositioning in bed and  placed pt on bed pan.  Pt was dependent x 2 for all bed mobility.  Pt requesting for time to be on the bed alvarado.  Nurse Jennifer notified.  Pt was educated on acute skilled PT services/goals and will return later for completion of eval.  Pt verbalized understanding.     Catherine Venegas, PT  02/06/2018

## 2018-02-06 NOTE — PROGRESS NOTES
Was asked to see the patient regarding evaluation of hypoxia.  Patient is off the unit to the operating room for surgery on her elbow.  She previously was cleared by primary.  We're in agreement with this and will do further evaluation from cardiac standpoint postoperatively.

## 2018-02-06 NOTE — BRIEF OP NOTE
[facility]  Brief Operative Note    SUMMARY     Surgery Date: 2/5/2018     Surgeon(s) and Role:     * Zack Faria MD - Primary    Assisting Surgeon: None    Pre-op Diagnosis:  Left humeral fracture [S42.302A]    Post-op Diagnosis:  Post-Op Diagnosis Codes:     * Left humeral fracture [S42.302A]    Procedure(s) (LRB):  OPEN REDUCTION INTERNAL FIXATION-HUMERUS  Superconduit (Left)    Anesthesia: General    Description of Procedure: Left distal humerus suprachondylar fracture ORIF    Description of the findings of the procedure: Left distal humerus suprachondylar fracture ORIF    Estimated Blood Loss: 50 mL         Specimens:   Specimen (12h ago through future)    None

## 2018-02-06 NOTE — NURSING
Bed saturated with urine. Patient cleaned up. Urine specimen collected. Patient complaining of pain. Will medicate per order.     Patient too lethargic to take PO meds at this time.

## 2018-02-06 NOTE — PROGRESS NOTES
Ortho Daily Progress Note    Ngoc Henderson is a 86 y.o. female admitted on 2/3/2018      Chief Complaint/Reason for admission: Fall (Pt here via EMS for trip and fall, possible arm fracture-swelling noted. pt reports pain 10/10. Pt reports hitting head, denies any LOC, blurry vision. )       Hospital Day: 3  Post Op Day: 1 Day Post-Op     The patient was seen and examined this morning at the bedside. Patient reports no acute issues overnight.  Patient reports that pain is adequately controlled.    _______________    Vitals:    02/05/18 2130 02/05/18 2135 02/05/18 2154 02/06/18 0405   BP: 131/60  129/60 (!) 137/97   Pulse: 96  97 98   Resp: 20 20 15 18   Temp: 98.4 °F (36.9 °C)  99.7 °F (37.6 °C) 98.1 °F (36.7 °C)   TempSrc: Axillary  Axillary Oral   SpO2: 97%  95% 98%   Weight:       Height:           Vital Signs (Most Recent)  Temp: 98.1 °F (36.7 °C) (02/06/18 0405)  Pulse: 98 (02/06/18 0405)  Resp: 18 (02/06/18 0405)  BP: (!) 137/97 (02/06/18 0405)  SpO2: 98 % (02/06/18 0405)    Vital Signs Range (Last 24H):  Temp:  [98.1 °F (36.7 °C)-99.7 °F (37.6 °C)]   Pulse:  []   Resp:  [15-22]   BP: (107-183)/(49-97)   SpO2:  [87 %-99 %]       Physical:    Baseline dementia  Incision/ dressing clean/dry/intact  Pt hard to get to follow commands  Appears to be fully neuro intact as was preop with difficult exam due to cognition  Palpable distal pulses  CR<3sec      Recent Labs      02/03/18   1715  02/04/18   0430  02/05/18   0547  02/05/18   2119   K  4.7  4.5  4.7  5.3*   CALCIUM  9.9  9.5  9.5  9.0   WBC  11.88  7.21  7.73   --    HGB  13.9  12.7  11.8*  11.9*   HCT  41.5  37.0  36.5*  36.1*   PLT  271  248  219   --    INR  1.0   --    --    --        I/O last 3 completed shifts:  In: 360 [P.O.:360]  Out: -           Assessment:  A/P POD 1 s/p left  Distal humerus ORIF             Plan:    PT/OT: hand wrist motion  Pain Control  DVT Prophylaxis: early ambulation, per primary    Discharge Plan: to nursing  yovani Faria MD  [facility]

## 2018-02-06 NOTE — PLAN OF CARE
Problem: Occupational Therapy Goal  Goal: Occupational Therapy Goal  Goals to be met by: 2/23/18    Patient will increase functional independence with ADLs by performing:    Feeding with Moderate Assistance.  UE Dressing with Moderate Assistance.  Grooming while seated with Minimal Assistance.  Toileting from bedside commode with Moderate Assistance for hygiene and clothing management.   Sitting at edge of bed x20 minutes with Contact Guard Assistance.  Stand pivot transfers with Moderate Assistance.  Upper extremity exercise program x5 reps per handout, with assistance as needed.    Outcome: Ongoing (interventions implemented as appropriate)  OT REC: SNF

## 2018-02-06 NOTE — OP NOTE
DATE OF PROCEDURE:  02/05/2018    SURGEON:  Zack Faria M.D.    ASSISTANT:  None.    PREOPERATIVE DIAGNOSIS:  Left distal humerus supracondylar fracture.    POSTOPERATIVE DIAGNOSIS:  Left distal humerus supracondylar fracture.    PROCEDURE PERFORMED:  Left distal humerus supracondylar fracture open reduction   and internal fixation.    COMPLICATIONS:  None.    IMPLANTS:  A 12-hole medial periarticular plate from [employer] and a 7-hole   lateral posterior periarticular plate.    HISTORY OF PRESENT ILLNESS:  The patient is an 86-year-old female who fell   suffering a left distal humerus supracondylar humeral fracture with   displacement.  She was admitted to the Hospitalist Service and cleared for   surgery.    PROCEDURE IN DETAIL:  After appropriate consents were signed, which included   discussion of possible risks and complications, which include, but not limited   to wound healing complications as well as skin breakdown and infection as well   as injury to nerves, vessels, soft tissue in the area as well as DVT, pulmonary   embolism, myocardial infarction, stroke and death.  The patient understood the   possible risks and complications and wished to proceed with the procedure.    Consent signed, the patient was brought to the Operating Room and kept in the   supine position, transferred to the operative table.  All bony prominences and   neurovascular structures well padded.  IV antibiotics have been infused.  A   timeout was called, which included the scrub tech, the anesthesia staff,   circulating nurse as well as surgical staff.  The incision was made posteriorly   over the posterior aspect of the humerus and elbow.  Large flaps were created   medially and laterally.  We dissected down to the humerus on the lateral aspect.    We dissected the ulnar nerve out of the cubital tunnel as well as tracking   proximally.  We then used the K-wires, reduction clamps and reduced the fracture   and hold this into place  provisionally.  We placed the posterolateral plate   first in order to get some purchase.  We then placed the medial periarticular   plate.  Using the plate as a reduction force we were able to reduce the fracture   very well.  We were very happy with the reduction on AP and lateral.  We filled   all cortical screws except 1 locking screw proximally to the fracture and all   locking screws distal to the fracture.  We took final x-rays, it was noticed at   that point, the patient had some incompetence to the lateral collateral   ligament, likely from the injury.  We used a #2 Ethibond to drill two tunnels in   the lateral epicondyle and then crack out through the lateral collateral   ligament in order to tie this back down to the bone.  The elbow was stable after   this.  The wound was irrigated well with pulsatile lavage as well as with a   Betadine solution, 17 mL of Betadine, 500 mL of normal saline to bulb   irrigation.  The elbow was brought through range of motion, moving very well.    Again, the area was irrigated well.  Tourniquet was let down and there was no   evidence of any arterial bleeding, venous bleeders were coagulated.  The deep   tissue closed with 0 Vicryl sutures, the subcutaneous tissue closed with 2-0   Vicryl sutures, inverted interrupted and the skin was approximated with a   running 3-0 nylon.  Sterile dressing applied to the wound as well as a posterior   slab splint.  The patient awoke from anesthesia without complications.  All the   counts were correct.    POSTOPERATIVE CARE:  The patient should remain in the splint, will followup and   place into a hinged elbow brace.        /joseph [id number] elgin(s)        ND/ELENITA  dd: 02/05/2018 20:48:59 (CST)  td: 02/06/2018 05:21:35 (CST)  Doc ID   #[id number]  Job ID #[id number]    CC:     [id number]

## 2018-02-06 NOTE — PROGRESS NOTES
Progress Note    Admit Date: 2/3/2018   LOS: 3 days     SUBJECTIVE:     Follow-up For:  Left humerus fracture    02/06/2018: Underwent ORIF to LUE. Moderate pain   02/05/2018: left UE pain moderate. Got it splinted yesterday. No fever or chills. +dysuria and constipation      Scheduled Meds:     amLODIPine  10 mg Oral Daily    clindamycin (CLEOCIN) IVPB  900 mg Intravenous Q8H    docusate sodium  100 mg Oral BID    famotidine (PF)  20 mg Intravenous Q12H    latanoprost  1 drop Both Eyes QHS    lisinopril  20 mg Oral Daily    metoprolol tartrate  25 mg Oral BID    sodium chloride 0.9%  3 mL Intravenous Q8H     Continuous Infusions:   sodium chloride 0.9% 75 mL/hr at 02/05/18 2209     PRN Meds:acetaminophen, acetaminophen, dextrose 50%, dextrose 50%, glucagon (human recombinant), glucose, glucose, HYDROmorphone, insulin aspart, morphine, ondansetron, ondansetron, oxyCODONE, ramelteon, vancomycin (VANCOCIN) IVPB    Review of patient's allergies indicates:   Allergen Reactions    Bactrim [sulfamethoxazole-trimethoprim]     Cephalexin Other (See Comments)     Leg cramps    Codeine Itching    Demerol [meperidine]     Etodolac Diarrhea    Metformin Diarrhea    Naproxen     Sulfur     Terbinafine     Darvocet a500 [propoxyphene n-acetaminophen] Rash       Review of Systems    Constitutional: moderate pain.  Eyes: no visual changes   ENT: no nasal congestion. Denies sore throat with odynophagia   Respiratory: No cough or sob    Cardiovascular: no chest pain or palpitations   Gastrointestinal: + constipation  Hematologic/Lymphatic: No lymphadenopathy, no significant fatigue, fever or night sweat. No mucocutaneous bleeding   Musculoskeletal: see HPI  Neurological: no seizures or tremors, gait or balance prblems  Skin: No rashes or lesions  Psych: chronic anxiety     OBJECTIVE:     Vital Signs (Most Recent)    Temp: 98.1 °F (36.7 °C) (02/06/18 0405)  Pulse: 98 (02/06/18 0405)  Resp: 18 (02/06/18 0405)  BP:  (!) 137/97 (02/06/18 0405)  SpO2: 98 % (02/06/18 0405)    Vital Signs Range (Last 24H):  Temp:  [98.1 °F (36.7 °C)-99.7 °F (37.6 °C)]   Pulse:  []   Resp:  [15-22]   BP: (107-183)/(49-97)   SpO2:  [87 %-99 %]     I & O (Last 24H):    Intake/Output Summary (Last 24 hours) at 02/06/18 0752  Last data filed at 02/05/18 2050   Gross per 24 hour   Intake             1000 ml   Output               50 ml   Net              950 ml     Physical Exam:    General: NAD, resting in bed.    Head: normocephalic, atraumatic   Eyes: conjunctivae pink, anicteric sclera.    Throat: No erythema or post nasal discharge   Neck: supple, no LAD   Lungs: decreased BS at the bases    Heart: S1, S2, RRR   Abdomen: + BS x 4 quadrants, soft, non tender. No hepatosplenomegaly.   Extremities: Left UE in splint and ace wrap. ROM limited    Skin: turgor normal, no erythema or rashes. No abnormal moles  MS: LUE ROM limited with pain.   Neuro: A&O x 2   Psy: calm      Laboratory:  CBC:     Recent Labs  Lab 02/05/18  0547 02/05/18  2119   WBC 7.73  --    RBC 3.77*  --    HGB 11.8* 11.9*   HCT 36.5* 36.1*     --    MCV 97  --    MCH 31.3*  --    MCHC 32.3  --      CMP:     Recent Labs  Lab 02/06/18  0618   *   CALCIUM 8.9      K 5.5*   CO2 25      BUN 35*   CREATININE 1.1     Coagulation:     Recent Labs  Lab 02/03/18  1715   LABPROT 10.1   INR 1.0     Cardiac markers:     Recent Labs  Lab 02/04/18  0430   TROPONINI <0.006     ABGs: No results for input(s): PH, PCO2, PO2, HCO3, POCSATURATED, BE in the last 168 hours.  Microbiology Results (last 7 days)     Procedure Component Value Units Date/Time    Urine culture [[id number]] Collected:  02/05/18 2250    Order Status:  Sent Specimen:  Urine from Urine, Clean Catch Updated:  02/06/18 0623        Specimen (12h ago through future)    None          Recent Labs  Lab 02/05/18  2250   COLORU Yellow   SPECGRAV 1.020   PHUR 5.0   PROTEINUA Negative   BACTERIA Moderate*   NITRITE  Positive*   LEUKOCYTESUR 3+*   UROBILINOGEN Negative       Diagnostic Results:    CONCLUSIONS     1 - TDS    2 - Normal left ventricular systolic function (EF 55-60%).     3 - Mild aortic stenosis, CHERI = 1.77 cm2, AVAi = 1.06 cm2/m2, peak velocity = 2.41 m/s, mean gradient = 13 mmHg.             This document has been electronically    SIGNED BY: Yoni Wesley MD On: 02/06/2018 15:35            ASSESSMENT/PLAN:     Active Hospital Problems     Diagnosis   POA    *Closed fracture of left distal humerus [S42.402A]- after a slip and fall accident at daughter's home  - pt has acceptable cardiac risk for this medically necessary surgical intervention     -  2-D Echo reviewed    - continue Beta blocker tx  - tolerated ORIF-HUMERUS  Superconduit (Left)      Yes    Acute pain [R52]  - hydromorphone prn     Yes    Fall [W19.XXXA]- high risk of fall again    - keep bed low      Yes    Hypoxia [R09.02]  - cxr no acute pathology     - 2- D Echo ; EF NL     Yes    Osteoporosis, 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      DVT prophylaxis: SCDs     Dysuria: Abnormal UA   - culture pending   -  Mukesh Saleh M.D  Internal Medicine & Geriatric Medicine  Hematology & Oncology  Palliative Medicine    [address], [address]  [place] [id number] [phone] (Office)  [phone] (Fax)

## 2018-02-06 NOTE — NURSING
Patient arrived to floor from PACU. Arouses to voice. Oriented to name and place. Left arm in sling with cast, capillary refill less than 3 seconds.

## 2018-02-06 NOTE — PT/OT/SLP EVAL
Occupational Therapy   Evaluation    Name: Ngoc Henderson  MRN: [id number]  Admitting Diagnosis:  Closed fracture of left distal humerus 1 Day Post-Op    Recommendations:     Discharge Recommendations: nursing facility, skilled  Discharge Equipment Recommendations:  none  Barriers to discharge:  None    History:     Occupational Profile:  Living Environment: Pt reside in [facility] prior to adm   Previous level of function: As per Pt's daughter, pt was able to ambulate with rollator and perform self care without assistance   Roles and Routines: return to nursing home  Equipment Owned:  rollator  Assistance upon Discharge: NH can assist    Past Medical History:   Diagnosis Date    Cataract     Diabetes mellitus type II     Glaucoma     Hyperlipidemia     Hypertension     Osteoporosis        Past Surgical History:   Procedure Laterality Date    CHOLECYSTECTOMY      excision left axillary mass      excision thigh masses      EYE SURGERY      HEMORRHOID SURGERY      HYSTERECTOMY      VULVECTOMY         Subjective     Chief Complaint: pain with movement  Patient/Family stated goals: daughter wants better to participate and get better  Communicated with: nurse Rodriguez prior to session.  Pain/Comfort:  · Pain Rating 1:  (Pt screaming in pain when moved)    Patients cultural, spiritual, Adventist conflicts given the current situation:      Objective:     Patient found with: peripheral IV, SCD, oxygen    General Precautions: Standard, fall   Orthopedic Precautions:N/A   Braces: UE Sling     Occupational Performance:    Bed Mobility:    · Patient completed Rolling/Turning to Left with  dependent and   persons  · Patient completed Rolling/Turning to Right with dependent and 2  persons  · Patient completed Supine to Sit with dependent and 2 persons  · Patient completed Sit to Supine with dependent and 2  persons    Activities of Daily Living:  · Grooming: dependence with hand over hand assist  · UB  "Dressing: total assistance      Cognitive/Visual Perceptual:  Cognitive/Psychosocial Skills:     -       Oriented to: Person   -       Follows Commands/attention:Inattentive and Easily distracted  Visual/Perceptual:      -Impaired       Physical Exam:  Balance:    -       Pt requires moderate to min support on edge of bed, No dynamic movement at this time    Patient left supine with all lines intact, call button in reach and daughter present    [facility] 6 Click:  [facility] Total Score: 6    Treatment & Education:  Roll of OT   Education:    Assessment:     Ngoc Henderson is a 86 y.o. female with a medical diagnosis of Closed fracture of left distal humerus.  She presents with good family support. Pt requires total care at this time. Ot will treat as per plan to address goals.  Performance deficits affecting function are weakness, impaired endurance, impaired self care skills, impaired functional mobilty, gait instability, impaired balance, impaired cognition, decreased coordination, decreased upper extremity function, decreased lower extremity function, pain, decreased ROM, impaired fine motor, impaired coordination, impaired joint extensibility, impaired muscle length.      Rehab Prognosis:  good; patient would benefit from acute skilled OT services to address these deficits and reach maximum level of function.         Clinical Decision Makin.  OT Low:  "Pt evaluation falls under low complexity for evaluation coding due to performance deficits noted in 1-3 areas as stated above and 0 co-morbities affecting current functional status. Data obtained from problem focused assessments. No modifications or assistance was required for completion of evaluation. Only brief occupational profile and history review completed."     Plan:     Patient to be seen 5 x/week to address the above listed problems via self-care/home management, therapeutic activities, therapeutic exercises  · Plan of Care Expires:    · Plan of Care " Reviewed with: patient, daughter    This Plan of care has been discussed with the patient who was involved in its development and understands and is in agreement with the identified goals and treatment plan    GOALS:    Occupational Therapy Goals        Problem: Occupational Therapy Goal    Goal Priority Disciplines Outcome Interventions   Occupational Therapy Goal     OT, PT/OT Ongoing (interventions implemented as appropriate)    Description:  Goals to be met by: 2/23/18    Patient will increase functional independence with ADLs by performing:    Feeding with Moderate Assistance.  UE Dressing with Moderate Assistance.  Grooming while seated with Minimal Assistance.  Toileting from bedside commode with Moderate Assistance for hygiene and clothing management.   Sitting at edge of bed x20 minutes with Contact Guard Assistance.  Stand pivot transfers with Moderate Assistance.  Upper extremity exercise program x5 reps per handout, with assistance as needed.                      Time Tracking:     OT Date of Treatment: 02/06/18  OT Start Time: 1106  OT Stop Time: 1129  OT Total Time (min): 23 min    Billable Minutes:Evaluation 23    Ese Avila OT  2/6/2018

## 2018-02-06 NOTE — ANESTHESIA PREPROCEDURE EVALUATION
02/06/2018  Ngoc Henderson is a 86 y.o., female.    Anesthesia Evaluation    I have reviewed the Patient Summary Reports.     I have reviewed the Medications.     Review of Systems  Anesthesia Hx:  No problems with previous Anesthesia   Denies Personal Hx of Anesthesia complications.   Hematology/Oncology:  Hematology Normal   Oncology Normal     EENT/Dental:EENT/Dental Normal   Cardiovascular:   Exercise tolerance: good Hypertension    Pulmonary:   Shortness of breath    Renal/:  Renal/ Normal     Hepatic/GI:  Hepatic/GI Normal    Musculoskeletal:  Musculoskeletal Normal    Neurological:  Neurology Normal    Endocrine:   Diabetes    Dermatological:  Skin Normal    Psych:  Psychiatric Normal           Physical Exam  General:  Well nourished    Airway/Jaw/Neck:  Airway Findings: Mouth Opening: Normal Tongue: Normal  Mallampati: II      Dental:  Dental Findings: In tact   Chest/Lungs:  Chest/Lungs Clear    Heart/Vascular:  Heart Findings: Normal Heart murmur: negative       Mental Status:  Mental Status Findings:  Cooperative, Alert and Oriented         Anesthesia Plan  Type of Anesthesia, risks & benefits discussed:  Anesthesia Type:  general, MAC, regional  Patient's Preference:   Intra-op Monitoring Plan: standard ASA monitors  Intra-op Monitoring Plan Comments:   Post Op Pain Control Plan: multimodal analgesia  Post Op Pain Control Plan Comments:   Induction:   IV  Beta Blocker:  Patient is on a Beta-Blocker and has received one dose within the past 24 hours (No further documentation required).       Informed Consent: Patient understands risks and agrees with Anesthesia plan.  Questions answered. Anesthesia consent signed with patient.  ASA Score: 3     Day of Surgery Review of History & Physical:            Ready For Surgery From Anesthesia Perspective.

## 2018-02-06 NOTE — PLAN OF CARE
Problem: Physical Therapy Goal  Goal: Physical Therapy Goal  Goals to be met by: 18     Patient will increase functional independence with mobility by performin. Supine to sit with Moderate Assistance  2. Sit to supine with Moderate Assistance  3. Rolling to Left and Right with Moderate Assistance  4. Sitting at edge of bed x 30 minutes with Stand-by Assistance  5. Lower extremity exercise program x 20 reps per handout, with SBA    Limited PT eval completed, pt kept eyes closed throughout eval and off unit for cardiology test.  PT to continue to assess.

## 2018-02-06 NOTE — OR NURSING
2103: Post-op bedside fingerstick CBG= 230. Dr. Shay notified and aware. No new orders noted. To treat per sliding scale upon pts return to floor per Dr. Shay. Will continue to monitor pt for any changes.

## 2018-02-06 NOTE — TRANSFER OF CARE
"Anesthesia Transfer of Care Note    Patient: Ngoc Henderson    Procedure(s) Performed: Procedure(s) (LRB):  OPEN REDUCTION INTERNAL FIXATION-HUMERUS  Superconduit (Left)    Patient location: PACU    Anesthesia Type: general    Transport from OR: Transported from OR on room air with adequate spontaneous ventilation    Post pain: adequate analgesia    Post assessment: no apparent anesthetic complications and tolerated procedure well    Post vital signs: stable    Level of consciousness: sedated and responds to stimulation    Nausea/Vomiting: no nausea/vomiting    Complications: none    Transfer of care protocol was followed      Last vitals:   Visit Vitals  BP (!) 183/94 (BP Location: Right arm)   Pulse 102   Temp 37.4 °C (99.3 °F) (Oral)   Resp 18   Ht 4' 10" (1.473 m)   Wt 74.8 kg (164 lb 14.5 oz)   SpO2 99%   Breastfeeding? No   BMI 34.47 kg/m²     "

## 2018-02-07 LAB
ANION GAP SERPL CALC-SCNC: 12 MMOL/L
BASOPHILS # BLD AUTO: 0.04 K/UL
BASOPHILS NFR BLD: 0.4 %
BUN SERPL-MCNC: 35 MG/DL
CALCIUM SERPL-MCNC: 8.8 MG/DL
CHLORIDE SERPL-SCNC: 111 MMOL/L
CO2 SERPL-SCNC: 21 MMOL/L
CREAT SERPL-MCNC: 1.1 MG/DL
DIFFERENTIAL METHOD: ABNORMAL
EOSINOPHIL # BLD AUTO: 0.1 K/UL
EOSINOPHIL NFR BLD: 0.8 %
ERYTHROCYTE [DISTWIDTH] IN BLOOD BY AUTOMATED COUNT: 13.6 %
EST. GFR  (AFRICAN AMERICAN): 53 ML/MIN/1.73 M^2
EST. GFR  (NON AFRICAN AMERICAN): 46 ML/MIN/1.73 M^2
GLUCOSE SERPL-MCNC: 195 MG/DL
HCT VFR BLD AUTO: 32.3 %
HGB BLD-MCNC: 10.5 G/DL
LYMPHOCYTES # BLD AUTO: 2.1 K/UL
LYMPHOCYTES NFR BLD: 20.6 %
MCH RBC QN AUTO: 31.3 PG
MCHC RBC AUTO-ENTMCNC: 32.5 G/DL
MCV RBC AUTO: 96 FL
MONOCYTES # BLD AUTO: 1.9 K/UL
MONOCYTES NFR BLD: 18.7 %
NEUTROPHILS # BLD AUTO: 6 K/UL
NEUTROPHILS NFR BLD: 59.5 %
PLATELET # BLD AUTO: 228 K/UL
PMV BLD AUTO: 9.6 FL
POCT GLUCOSE: 203 MG/DL (ref 70–110)
POCT GLUCOSE: 254 MG/DL (ref 70–110)
POCT GLUCOSE: 254 MG/DL (ref 70–110)
POCT GLUCOSE: 337 MG/DL (ref 70–110)
POTASSIUM SERPL-SCNC: 4.9 MMOL/L
RBC # BLD AUTO: 3.36 M/UL
SODIUM SERPL-SCNC: 144 MMOL/L
WBC # BLD AUTO: 10.09 K/UL

## 2018-02-07 PROCEDURE — 99232 SBSQ HOSP IP/OBS MODERATE 35: CPT | Mod: ,,, | Performed by: INTERNAL MEDICINE

## 2018-02-07 PROCEDURE — 25000003 PHARM REV CODE 250: Performed by: INTERNAL MEDICINE

## 2018-02-07 PROCEDURE — 80048 BASIC METABOLIC PNL TOTAL CA: CPT

## 2018-02-07 PROCEDURE — 11000001 HC ACUTE MED/SURG PRIVATE ROOM

## 2018-02-07 PROCEDURE — 25000003 PHARM REV CODE 250: Performed by: EMERGENCY MEDICINE

## 2018-02-07 PROCEDURE — S0077 INJECTION, CLINDAMYCIN PHOSP: HCPCS | Performed by: ORTHOPAEDIC SURGERY

## 2018-02-07 PROCEDURE — 63600175 PHARM REV CODE 636 W HCPCS: Performed by: INTERNAL MEDICINE

## 2018-02-07 PROCEDURE — 36415 COLL VENOUS BLD VENIPUNCTURE: CPT

## 2018-02-07 PROCEDURE — 25000003 PHARM REV CODE 250: Performed by: ORTHOPAEDIC SURGERY

## 2018-02-07 PROCEDURE — 97530 THERAPEUTIC ACTIVITIES: CPT

## 2018-02-07 PROCEDURE — 94761 N-INVAS EAR/PLS OXIMETRY MLT: CPT

## 2018-02-07 PROCEDURE — S0028 INJECTION, FAMOTIDINE, 20 MG: HCPCS | Performed by: EMERGENCY MEDICINE

## 2018-02-07 PROCEDURE — 97116 GAIT TRAINING THERAPY: CPT

## 2018-02-07 PROCEDURE — 85025 COMPLETE CBC W/AUTO DIFF WBC: CPT

## 2018-02-07 PROCEDURE — A4216 STERILE WATER/SALINE, 10 ML: HCPCS | Performed by: EMERGENCY MEDICINE

## 2018-02-07 RX ADMIN — CLINDAMYCIN IN 5 PERCENT DEXTROSE 900 MG: 18 INJECTION, SOLUTION INTRAVENOUS at 03:02

## 2018-02-07 RX ADMIN — AMLODIPINE BESYLATE 10 MG: 5 TABLET ORAL at 08:02

## 2018-02-07 RX ADMIN — SODIUM CHLORIDE: 0.9 INJECTION, SOLUTION INTRAVENOUS at 03:02

## 2018-02-07 RX ADMIN — METOPROLOL TARTRATE 25 MG: 25 TABLET, FILM COATED ORAL at 08:02

## 2018-02-07 RX ADMIN — OXYCODONE HYDROCHLORIDE 10 MG: 5 TABLET ORAL at 06:02

## 2018-02-07 RX ADMIN — INSULIN ASPART 2 UNITS: 100 INJECTION, SOLUTION INTRAVENOUS; SUBCUTANEOUS at 08:02

## 2018-02-07 RX ADMIN — LATANOPROST 1 DROP: 50 SOLUTION/ DROPS OPHTHALMIC at 09:02

## 2018-02-07 RX ADMIN — INSULIN ASPART 3 UNITS: 100 INJECTION, SOLUTION INTRAVENOUS; SUBCUTANEOUS at 12:02

## 2018-02-07 RX ADMIN — SODIUM CHLORIDE: 0.9 INJECTION, SOLUTION INTRAVENOUS at 04:02

## 2018-02-07 RX ADMIN — METOPROLOL TARTRATE 25 MG: 25 TABLET, FILM COATED ORAL at 09:02

## 2018-02-07 RX ADMIN — DOCUSATE SODIUM 100 MG: 100 CAPSULE, LIQUID FILLED ORAL at 08:02

## 2018-02-07 RX ADMIN — FAMOTIDINE 20 MG: 10 INJECTION, SOLUTION INTRAVENOUS at 08:02

## 2018-02-07 RX ADMIN — CLINDAMYCIN IN 5 PERCENT DEXTROSE 900 MG: 18 INJECTION, SOLUTION INTRAVENOUS at 08:02

## 2018-02-07 RX ADMIN — CIPROFLOXACIN HYDROCHLORIDE 500 MG: 500 TABLET, FILM COATED ORAL at 09:02

## 2018-02-07 RX ADMIN — FAMOTIDINE 20 MG: 10 INJECTION, SOLUTION INTRAVENOUS at 09:02

## 2018-02-07 RX ADMIN — OXYCODONE HYDROCHLORIDE 10 MG: 5 TABLET ORAL at 03:02

## 2018-02-07 RX ADMIN — Medication 3 ML: at 09:02

## 2018-02-07 RX ADMIN — INSULIN ASPART 3 UNITS: 100 INJECTION, SOLUTION INTRAVENOUS; SUBCUTANEOUS at 09:02

## 2018-02-07 RX ADMIN — CIPROFLOXACIN HYDROCHLORIDE 500 MG: 500 TABLET, FILM COATED ORAL at 08:02

## 2018-02-07 RX ADMIN — INSULIN ASPART 4 UNITS: 100 INJECTION, SOLUTION INTRAVENOUS; SUBCUTANEOUS at 05:02

## 2018-02-07 RX ADMIN — DOCUSATE SODIUM 100 MG: 100 CAPSULE, LIQUID FILLED ORAL at 09:02

## 2018-02-07 RX ADMIN — CLINDAMYCIN IN 5 PERCENT DEXTROSE 900 MG: 18 INJECTION, SOLUTION INTRAVENOUS at 11:02

## 2018-02-07 NOTE — PT/OT/SLP PROGRESS
Occupational Therapy   Treatment    Name: Ngoc Henderson  MRN: [id number]  Admitting Diagnosis:  Closed fracture of left distal humerus  2 Days Post-Op    Recommendations:     Discharge Recommendations: nursing facility, skilled  Discharge Equipment Recommendations:  none  Barriers to discharge:  None    Subjective     Communicated with: Nurse prior to session.  Patient agreeable to therapy  Pain/Comfort:  · Pain Rating 1:  (pt c/o RLE pain)  · Pain Addressed 1: Pre-medicate for activity, Reposition, Distraction, Cessation of Activity, Nurse notified    Patients cultural, spiritual, Restorationism conflicts given the current situation:      Objective:     Patient found with: peripheral IV, SCD, oxygen    General Precautions: Standard, fall   Orthopedic Precautions:N/A   Braces: UE Sling     Occupational Performance:    Bed Mobility:    · Patient completed Scooting/Bridging with dependent and 2 persons  · Patient completed Supine to Sit with dependent and 2 persons  · Patient completed Sit to Supine with dependent and 2 persons     Functional Mobility/Transfers:  · Patient completed Sit <> Stand Transfer with moderate assistance and of 2 persons  with  quad cane (x 3 trials)  · Functional Mobility: patient able to side step along HOB with quad cane/mod A.    Activities of Daily Living:  · Feeding:  patient able to drink from cup x 3    · Grooming: moderate assistance to rinse mouth with mouthwash seated EOB, set up assistance to wash face with washcloth  · UB Dressing: total assistance    · LB Dressing: total assistance     · Max verbal cues for sequencing and initiation for self care tasks.     Patient left HOB elevated with SCDs in place, RUE elevated and BLE elevated on pillows all lines intact, call button in reach, bed alarm on and   notified    AMPAC 6 Click:  AMPAC Total Score: 7    Treatment & Education:  Patient performed bed mobility, transfers, and ADL's as above. Patient able to sit EOB ~15 minutes with SBA-CGA  for balance.   Education:    Assessment:     Ngoc Henderson is a 86 y.o. female with a medical diagnosis of Closed fracture of left distal humerus.  She presents with the following performance deficits affecting function: weakness, impaired endurance, impaired self care skills, impaired functional mobilty, gait instability, decreased coordination, decreased upper extremity function, decreased lower extremity function, impaired balance, decreased safety awareness, pain, decreased ROM, impaired coordination, impaired cardiopulmonary response to activity, orthopedic precautions.  Patient with good participation in OT with increased mobility noted today.    Rehab Prognosis:  good; patient would benefit from acute skilled OT services to address these deficits and reach maximum level of function.       Plan:     Patient to be seen 5 x/week to address the above listed problems via self-care/home management, therapeutic activities, therapeutic exercises  · Plan of Care Expires:    · Plan of Care Reviewed with: patient, daughter    This Plan of care has been discussed with the patient who was involved in its development and understands and is in agreement with the identified goals and treatment plan    GOALS:    Occupational Therapy Goals        Problem: Occupational Therapy Goal    Goal Priority Disciplines Outcome Interventions   Occupational Therapy Goal     OT, PT/OT Ongoing (interventions implemented as appropriate)    Description:  Goals to be met by: 2/23/18    Patient will increase functional independence with ADLs by performing:    Feeding with Moderate Assistance.  UE Dressing with Moderate Assistance.  Grooming while seated with Minimal Assistance.  Toileting from bedside commode with Moderate Assistance for hygiene and clothing management.   Sitting at edge of bed x20 minutes with Contact Guard Assistance.  Stand pivot transfers with Moderate Assistance.  Upper extremity exercise program x5 reps per handout, with  assistance as needed.                      Time Tracking:     OT Date of Treatment:    OT Start Time: 1517  OT Stop Time: 1546  OT Total Time (min): 29 min    Billable Minutes:Therapeutic Activity 14 ( co tx with PT)    ISSA Byrd  2/7/2018

## 2018-02-07 NOTE — HPI
86 year old female with DM, HLD, HTN, glaucoma, and osteoporosis presents to the ED via EMS in c-collar and spine board c/o L arm and elbow pain s/p slip and fall PTA. Pt reports she was walking in her house and slipped and fell landing on her L arm. Pain is 10/10. Pain is exacerbated with palpation and with movement. No head trauma or LOC. Pt denies having any chest pain, SOB, or dizziness prior to the fall. No other injuries noted. Pt denies neck pain, back pain, headaches, vision changes, and any other associated symptoms. no prior attempted treatment. No alleviating factors.    Positive for initially hypoxia but also cardiac evaluation.  She had echocardiogram done today that showed normal LV function.  She has mild aortic stenosis.  Mainly complaining of pain postoperatively.  She underwent ORIF to the left upper extremity.  Cardiac symptoms of been fairly stable apart from imbalance prior to fall.  She denies any current chest pain, shortness of breath or palpitations.  She's expands no PND, orthopnea or lower edema.  She denies any history of dizziness leading to presyncope or syncope.

## 2018-02-07 NOTE — PLAN OF CARE
Problem: Occupational Therapy Goal  Goal: Occupational Therapy Goal  Goals to be met by: 2/23/18    Patient will increase functional independence with ADLs by performing:    Feeding with Moderate Assistance.  UE Dressing with Moderate Assistance.  Grooming while seated with Minimal Assistance.  Toileting from bedside commode with Moderate Assistance for hygiene and clothing management.   Sitting at edge of bed x20 minutes with Contact Guard Assistance.  Stand pivot transfers with Moderate Assistance.  Upper extremity exercise program x5 reps per handout, with assistance as needed.     Outcome: Ongoing (interventions implemented as appropriate)  Patient with good participation in OT with increased mobility noted today. Patient will benefit from continued skilled OT.

## 2018-02-07 NOTE — PROGRESS NOTES
[facility]  Cardiology  Progress Note    Patient Name: Ngoc Henderson  MRN: [id number]  Admission Date: 2/3/2018  Hospital Length of Stay: 4 days  Code Status: Full Code   Attending Physician: Hector Saleh MD   Primary Care Physician: Hector Saleh MD  Expected Discharge Date:   Principal Problem:Closed fracture of left distal humerus    Subjective:     Hospital Course:   2-6: Status post ORIF    Interval History: Patient was resting comfortably.  Chart reviewed in no acute events.  There is no family at bedside currently    Review of Systems   Unable to perform ROS: other     Objective:     Vital Signs (Most Recent):  Temp: 97.8 °F (36.6 °C) (02/07/18 1204)  Pulse: 89 (02/07/18 1204)  Resp: 19 (02/07/18 1204)  BP: (!) 116/57 (02/07/18 1204)  SpO2: 98 % (02/07/18 1204) Vital Signs (24h Range):  Temp:  [97 °F (36.1 °C)-99 °F (37.2 °C)] 97.8 °F (36.6 °C)  Pulse:  [] 89  Resp:  [16-19] 19  SpO2:  [91 %-98 %] 98 %  BP: (112-145)/(54-65) 116/57     Weight: 74.8 kg (164 lb 14.5 oz)  Body mass index is 34.47 kg/m².     SpO2: 98 %  O2 Device (Oxygen Therapy): room air      Intake/Output Summary (Last 24 hours) at 02/07/18 1656  Last data filed at 02/07/18 1600   Gross per 24 hour   Intake             2370 ml   Output                0 ml   Net             2370 ml       Lines/Drains/Airways     Peripheral Intravenous Line                 Peripheral IV - Single Lumen 02/05/18 1800 Right Hand 1 day                Physical Exam  Deferred today    Significant Labs:   BMP:   Recent Labs  Lab 02/05/18 2119 02/06/18  0618 02/07/18  0619   * 198* 195*    141 144   K 5.3* 5.5* 4.9    106 111*   CO2 23 25 21*   BUN 33* 35* 35*   CREATININE 1.2 1.1 1.1   CALCIUM 9.0 8.9 8.8    and CBC   Recent Labs  Lab 02/05/18 2119 02/06/18  0618 02/07/18 0619   WBC  --  11.73 10.09   HGB 11.9* 11.6* 10.5*   HCT 36.1* 35.3* 32.3*   PLT  --  222 228       Significant Imaging: Echocardiogram:   2D echo  with color flow doppler:   Results for orders placed or performed during the hospital encounter of 02/03/18   2D echo with color flow doppler   Result Value Ref Range    EF 55 55 - 65    Diastolic Dysfunction No     Aortic Valve Stenosis MILD (A)     Est. PA Systolic Pressure 29.21     Pericardial Effusion NONE     Tricuspid Valve Regurgitation MILD      Assessment and Plan:     Brief HPI:     * Closed fracture of left distal humerus    Status post ORIF  Management per ortho        SOB (shortness of breath)    Initial mild hypoxia now corrected  EF preserved with only mild aortic stenosis  Conservative therapy as discussed due to advanced age        Hypertension             Hyperlipidemia             Diabetes mellitus, type II    Per IM            VTE Risk Mitigation         Ordered     Medium Risk of VTE  Once      02/03/18 2125     Place EHSAN hose  Until discontinued      02/03/18 2125     Place sequential compression device  Until discontinued      02/03/18 2125        No further recommendations from CV standpoint or testing planned.  We will see as needed    Yoni Wesley MD  Cardiology  [facility]

## 2018-02-07 NOTE — PROGRESS NOTES
Pt's clinicals sent to [facility] for review. Surgery cleared pt for discharge.

## 2018-02-07 NOTE — PLAN OF CARE
Problem: Patient Care Overview  Goal: Plan of Care Review  Outcome: Ongoing (interventions implemented as appropriate)  Pt free of accidental injury during shift. No s/s of distress noted. Disoriented to time and situation. IVF infusing with abt administered as scheduled. C/O of excruciating pain to DAY during movement. Medicated as prescribed. S/P ORIF to left arm. Dressed with ace bandage and in sling. Elevated on pillow. Tolerating diet well. IS at bedside. Insulin administered per sliding scale. Safety measures maintained. Call bell within reach. Frequent rounds made. Will continue to monitor

## 2018-02-07 NOTE — PLAN OF CARE
Problem: Patient Care Overview  Goal: Plan of Care Review  Outcome: Ongoing (interventions implemented as appropriate)  Patient remains free from injury and falls. Confused throughout shift. Reorientation provided as needed. Complains of pain when being turned. Left hand warm to touch, capillary refill less than 3 seconds. IVF infusing, IV antibiotic administered. Incontinence care provided as needed. Patient unable to use incentive spirometer. Due to not understanding. Plan of care continued.

## 2018-02-07 NOTE — PROGRESS NOTES
Progress Note    Admit Date: 2/3/2018   LOS: 4 days     SUBJECTIVE:     Follow-up For:  Left humerus fracture    02/07/2018: some confusion. Tolerating current tx   02/06/2018: Underwent ORIF to LUE. Moderate pain   02/05/2018: left UE pain moderate. Got it splinted yesterday. No fever or chills. +dysuria and constipation      Scheduled Meds:     amLODIPine  10 mg Oral Daily    ciprofloxacin HCl  500 mg Oral Q12H    clindamycin (CLEOCIN) IVPB  900 mg Intravenous Q8H    docusate sodium  100 mg Oral BID    famotidine (PF)  20 mg Intravenous Q12H    latanoprost  1 drop Both Eyes QHS    lisinopril  20 mg Oral Daily    metoprolol tartrate  25 mg Oral BID    sodium chloride 0.9%  3 mL Intravenous Q8H     Continuous Infusions:   sodium chloride 0.9% 75 mL/hr at 02/07/18 0432     PRN Meds:acetaminophen, acetaminophen, dextrose 50%, dextrose 50%, glucagon (human recombinant), glucose, glucose, HYDROmorphone, insulin aspart, ondansetron, ondansetron, oxyCODONE, ramelteon, vancomycin (VANCOCIN) IVPB    Review of patient's allergies indicates:   Allergen Reactions    Bactrim [sulfamethoxazole-trimethoprim]     Cephalexin Other (See Comments)     Leg cramps    Codeine Itching    Demerol [meperidine]     Etodolac Diarrhea    Metformin Diarrhea    Naproxen     Sulfur     Terbinafine     Darvocet a500 [propoxyphene n-acetaminophen] Rash       Review of Systems    Constitutional: moderate pain.  Eyes: no visual changes   ENT: no nasal congestion. Denies sore throat with odynophagia   Respiratory: No cough or sob    Cardiovascular: no chest pain or palpitations   Gastrointestinal: + constipation  Hematologic/Lymphatic: No lymphadenopathy, no significant fatigue, fever or night sweat. No mucocutaneous bleeding   Musculoskeletal: see HPI  Neurological: ? Confusion   Skin: No rashes or lesions  Psych: chronic anxiety     OBJECTIVE:     Vital Signs (Most Recent)    Temp: 98.6 °F (37 °C) (02/07/18 0421)  Pulse: 95  (02/07/18 0421)  Resp: 18 (02/07/18 0421)  BP: 130/62 (02/07/18 0421)  SpO2: (!) 93 % (02/07/18 0421)    Vital Signs Range (Last 24H):  Temp:  [97 °F (36.1 °C)-99 °F (37.2 °C)]   Pulse:  []   Resp:  [16-19]   BP: (112-148)/(56-65)   SpO2:  [91 %-97 %]     I & O (Last 24H):    Intake/Output Summary (Last 24 hours) at 02/07/18 0753  Last data filed at 02/07/18 0000   Gross per 24 hour   Intake          1928.75 ml   Output                0 ml   Net          1928.75 ml     Physical Exam:    General: NAD, resting in bed.    Head: normocephalic, atraumatic   Eyes: conjunctivae pink, anicteric sclera.    Throat: No erythema or post nasal discharge   Neck: supple, no LAD   Lungs: decreased BS at the bases    Heart: S1, S2, RRR   Abdomen: + BS x 4 quadrants, soft, non tender. No hepatosplenomegaly.   Extremities: Left UE in splint and ace wrap. ROM limited    Skin: turgor normal, no erythema or rashes. No abnormal moles  MS: LUE ROM limited with pain.   Neuro: awake, oriented to first name only    Psy: calm      Laboratory:  CBC:     Recent Labs  Lab 02/07/18  0619   WBC 10.09   RBC 3.36*   HGB 10.5*   HCT 32.3*      MCV 96   MCH 31.3*   MCHC 32.5     CMP:     Recent Labs  Lab 02/07/18  0619   *   CALCIUM 8.8      K 4.9   CO2 21*   *   BUN 35*   CREATININE 1.1     Coagulation:     Recent Labs  Lab 02/03/18  1715   LABPROT 10.1   INR 1.0     Cardiac markers:     Recent Labs  Lab 02/04/18  0430   TROPONINI <0.006     ABGs: No results for input(s): PH, PCO2, PO2, HCO3, POCSATURATED, BE in the last 168 hours.  Microbiology Results (last 7 days)     Procedure Component Value Units Date/Time    Urine culture [[id number]] Collected:  02/05/18 2250    Order Status:  Sent Specimen:  Urine from Urine, Clean Catch Updated:  02/06/18 0623        Specimen (12h ago through future)    None          Recent Labs  Lab 02/05/18  2250   COLORU Yellow   SPECGRAV 1.020   PHUR 5.0   PROTEINUA Negative   BACTERIA  Moderate*   NITRITE Positive*   LEUKOCYTESUR 3+*   UROBILINOGEN Negative       Diagnostic Results:    CONCLUSIONS     1 - TDS    2 - Normal left ventricular systolic function (EF 55-60%).     3 - Mild aortic stenosis, CHERI = 1.77 cm2, AVAi = 1.06 cm2/m2, peak velocity = 2.41 m/s, mean gradient = 13 mmHg.             This document has been electronically    SIGNED BY: Yoni Wesley MD On: 02/06/2018 15:35            ASSESSMENT/PLAN:     Active Hospital Problems     Diagnosis   POA    *Closed fracture of left distal humerus [S42.402A]- after a slip and fall accident at daughter's home  - pt has acceptable cardiac risk for this medically necessary surgical intervention     -  2-D Echo reviewed    - continue Beta blocker tx  - tolerated ORIF-HUMERUS  [employer] (Left)      Yes    Acute pain [R52]  - hydromorphone prn     Yes    Fall [W19.XXXA]- high risk of fall again    - keep bed low      Yes    Hypoxia [R09.02]  - cxr no acute pathology   - 2- D Echo ; EF NL  - cards seen pt      Yes    Osteoporosis, 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      DVT prophylaxis: SCDs     Dysuria: Abnormal UA   - culture - E.coli    - Cipro     Confusional state: pt high risk for delirium an fall    - close monitoring    Discussed with daughter, Ms. Nahed Mathews.     Hector Saleh M.D  Internal Medicine & Geriatric Medicine  Hematology & Oncology  Palliative Medicine    [address] [place] [address], [address]  [place] [address]  [phone] (Office)  [phone] (Fax)

## 2018-02-07 NOTE — ASSESSMENT & PLAN NOTE
Initial mild hypoxia now corrected  EF preserved with only mild aortic stenosis  Conservative therapy and discuss due to advanced age

## 2018-02-07 NOTE — SUBJECTIVE & OBJECTIVE
Interval History: Patient was resting comfortably.  Chart reviewed in no acute events    Review of Systems   Unable to perform ROS: other     Objective:     Vital Signs (Most Recent):  Temp: 97.8 °F (36.6 °C) (02/07/18 1204)  Pulse: 89 (02/07/18 1204)  Resp: 19 (02/07/18 1204)  BP: (!) 116/57 (02/07/18 1204)  SpO2: 98 % (02/07/18 1204) Vital Signs (24h Range):  Temp:  [97 °F (36.1 °C)-99 °F (37.2 °C)] 97.8 °F (36.6 °C)  Pulse:  [] 89  Resp:  [16-19] 19  SpO2:  [91 %-98 %] 98 %  BP: (112-145)/(54-65) 116/57     Weight: 74.8 kg (164 lb 14.5 oz)  Body mass index is 34.47 kg/m².     SpO2: 98 %  O2 Device (Oxygen Therapy): room air      Intake/Output Summary (Last 24 hours) at 02/07/18 1656  Last data filed at 02/07/18 1600   Gross per 24 hour   Intake             2370 ml   Output                0 ml   Net             2370 ml       Lines/Drains/Airways     Peripheral Intravenous Line                 Peripheral IV - Single Lumen 02/05/18 1800 Right Hand 1 day                Physical Exam  Deferred today    Significant Labs:   BMP:   Recent Labs  Lab 02/05/18 2119 02/06/18 0618 02/07/18  0619   * 198* 195*    141 144   K 5.3* 5.5* 4.9    106 111*   CO2 23 25 21*   BUN 33* 35* 35*   CREATININE 1.2 1.1 1.1   CALCIUM 9.0 8.9 8.8    and CBC   Recent Labs  Lab 02/05/18 2119 02/06/18 0618 02/07/18  0619   WBC  --  11.73 10.09   HGB 11.9* 11.6* 10.5*   HCT 36.1* 35.3* 32.3*   PLT  --  222 228       Significant Imaging: Echocardiogram:   2D echo with color flow doppler:   Results for orders placed or performed during the hospital encounter of 02/03/18   2D echo with color flow doppler   Result Value Ref Range    EF 55 55 - 65    Diastolic Dysfunction No     Aortic Valve Stenosis MILD (A)     Est. PA Systolic Pressure 29.21     Pericardial Effusion NONE     Tricuspid Valve Regurgitation MILD

## 2018-02-07 NOTE — CONSULTS
[facility]  Cardiology  Consult Note    Patient Name: Ngoc Henderson  MRN: [id number]  Admission Date: 2/3/2018  Hospital Length of Stay: 3 days  Code Status: Full Code   Attending Provider: Hector Saleh MD   Consulting Provider: Yoni Berry MD  Primary Care Physician: Hector Saleh MD  Principal Problem:Closed fracture of left distal humerus    Patient information was obtained from patient, past medical records and ER records.     Inpatient consult to Cardiology  Consult performed by: YONI BERRY  Consult ordered by: EVELIO WELCH  Reason for consult: Cardiac evaluation        Subjective:     Chief Complaint:  Arm pain     HPI:   86 year old female with DM, HLD, HTN, glaucoma, and osteoporosis presents to the ED via EMS in c-collar and spine board c/o L arm and elbow pain s/p slip and fall PTA. Pt reports she was walking in her house and slipped and fell landing on her L arm. Pain is 10/10. Pain is exacerbated with palpation and with movement. No head trauma or LOC. Pt denies having any chest pain, SOB, or dizziness prior to the fall. No other injuries noted. Pt denies neck pain, back pain, headaches, vision changes, and any other associated symptoms. no prior attempted treatment. No alleviating factors.    Positive for initially hypoxia but also cardiac evaluation.  She had echocardiogram done today that showed normal LV function.  She has mild aortic stenosis.  Mainly complaining of pain postoperatively.  She underwent ORIF to the left upper extremity.  Cardiac symptoms of been fairly stable apart from imbalance prior to fall.  She denies any current chest pain, shortness of breath or palpitations.  She's expands no PND, orthopnea or lower edema.  She denies any history of dizziness leading to presyncope or syncope.    Past Medical History:   Diagnosis Date    Cataract     Diabetes mellitus type II     Glaucoma     Hyperlipidemia     Hypertension     Osteoporosis         Past Surgical History:   Procedure Laterality Date    CHOLECYSTECTOMY      excision left axillary mass      excision thigh masses      EYE SURGERY      HEMORRHOID SURGERY      HYSTERECTOMY      VULVECTOMY         Review of patient's allergies indicates:   Allergen Reactions    Bactrim [sulfamethoxazole-trimethoprim]     Cephalexin Other (See Comments)     Leg cramps    Codeine Itching    Demerol [meperidine]     Etodolac Diarrhea    Metformin Diarrhea    Naproxen     Sulfur     Terbinafine     Darvocet a500 [propoxyphene n-acetaminophen] Rash       No current facility-administered medications on file prior to encounter.      Current Outpatient Prescriptions on File Prior to Encounter   Medication Sig    amlodipine (NORVASC) 10 MG tablet Take 1 tablet (10 mg total) by mouth once daily.    escitalopram oxalate (LEXAPRO) 10 MG tablet Take 1 tablet (10 mg total) by mouth once daily.    gabapentin (NEURONTIN) 100 MG capsule Take 100 mg by mouth 3 (three) times daily.    hydrocodone-acetaminophen 5-325mg (NORCO) 5-325 mg per tablet Take 1 tablet by mouth every 12 (twelve) hours as needed for Pain. Severe pain    LANTUS SOLOSTAR 100 unit/mL (3 mL) InPn pen inject 32 UNITS IN THE MORNING (Patient taking differently: inject 48 UNITS IN THE evening)    latanoprost (XALATAN) 0.005 % ophthalmic solution Place 1 drop into both eyes every evening.      metoprolol tartrate (LOPRESSOR) 25 MG tablet Take 1 tablet (25 mg total) by mouth 2 (two) times daily.    ACCU-CHEK FASTCLIX [facility]     alendronate (FOSAMAX) 70 MG tablet     blood sugar diagnostic (ACCU-CHEK SMARTVIEW TEST STRIP) Strp use with insulin AS DIRECTED as directed    blood sugar diagnostic Strp test TWO TO THREE TIMES DAILY    blood-glucose meter (ACCU-CHEK DORCAS) Misc Please provide glucometer covered by the insurance company    clindamycin (CLEOCIN) 150 MG capsule Take 2 capsules (300 mg total) by mouth every 6 (six) hours.     "desonide 0.05% (DESOWEN) 0.05 % Oint VICTOR HUGO AA BID RASH    dextromethorphan-guaifenesin  mg (MUCINEX DM)  mg per 12 hr tablet Take 1 tablet by mouth every 12 (twelve) hours.    EASY TOUCH TWIST LANCETS 32 gauge Misc test DAILY AS DIRECTED    fluconazole (DIFLUCAN) 200 MG Tab Take 1 tablet (200 mg total) by mouth once daily.    fluocinonide (LIDEX) 0.05 % ointment 1 application 2 (two) times daily. Apply to affected area    FREESTYLE LANCETS 28 gauge lancets USE DAILY    glipiZIDE (GLUCOTROL) 10 MG tablet Take 1 tablet (10 mg total) by mouth 2 (two) times daily with meals.    insulin needles, disposable, (NOVOFINE 32) 32 x 1/4 " Ndle Inject 1 Units into the skin once daily.    lancets (FREESTYLE LANCETS) 28 gauge Misc Inject 1 lancet into the skin once daily.    lisinopril (PRINIVIL,ZESTRIL) 40 MG tablet Take 1 tablet (40 mg total) by mouth once daily.    miconazole (MICOTIN) 2 % cream Apply topically 2 (two) times daily.    naproxen (NAPROSYN) 500 MG tablet Take 1 tablet (500 mg total) by mouth 2 (two) times daily with meals.    nitrofurantoin (MACRODANTIN) 100 MG capsule Take 100 mg by mouth 4 (four) times daily.    NOVOFINE 32 32 gauge x 1/4" Ndle Inject 1 Units into the skin once daily.    NOVOFINE 32 32 gauge x 1/4" Ndle Inject 1 Units into the skin once daily.    nystatin (MYCOSTATIN) ointment Apply topically 2 (two) times daily.    polyethylene glycol (GLYCOLAX) 17 gram/dose powder     simvastatin (ZOCOR) 40 MG tablet TAKE 1 TABLET BY MOUTH EVERY EVENING    tizanidine 2 mg Cap Take by mouth.    triamcinolone acetonide 0.1% (KENALOG) 0.1 % ointment     TRUE METRIX GLUCOSE TEST STRIP Strp TEST TWO TO THREE TIMES DAILY    TRUEPLUS LANCETS 30 gauge Misc TEST TWO TO THREE TIMES DAILY     Family History     Problem Relation (Age of Onset)    Diabetes Mother, Son        Social History Main Topics    Smoking status: Never Smoker    Smokeless tobacco: Never Used    Alcohol use No    " Drug use: No    Sexual activity: Not Currently     Review of Systems   Constitution: Negative.   HENT: Negative.    Eyes: Negative.    Cardiovascular: Negative for chest pain, dyspnea on exertion, irregular heartbeat, leg swelling, near-syncope, orthopnea, palpitations, paroxysmal nocturnal dyspnea and syncope.   Respiratory: Negative for shortness of breath.    Skin: Negative.    Musculoskeletal: Positive for falls, joint pain and joint swelling.   Gastrointestinal: Negative for abdominal pain, constipation and diarrhea.   Genitourinary: Negative for dysuria.   Neurological: Negative.    Psychiatric/Behavioral: Negative.      Objective:     Vital Signs (Most Recent):  Temp: 97 °F (36.1 °C) (02/06/18 1735)  Pulse: 104 (02/06/18 1735)  Resp: 16 (02/06/18 1735)  BP: (!) 145/65 (02/06/18 1735)  SpO2: 95 % (02/06/18 1735) Vital Signs (24h Range):  Temp:  [97 °F (36.1 °C)-99.7 °F (37.6 °C)] 97 °F (36.1 °C)  Pulse:  [] 104  Resp:  [15-22] 16  SpO2:  [87 %-99 %] 95 %  BP: (129-183)/(60-97) 145/65     Weight: 74.8 kg (164 lb 14.5 oz)  Body mass index is 34.47 kg/m².    SpO2: 95 %  O2 Device (Oxygen Therapy): room air      Intake/Output Summary (Last 24 hours) at 02/06/18 1903  Last data filed at 02/06/18 1700   Gross per 24 hour   Intake          2668.75 ml   Output               50 ml   Net          2618.75 ml       Lines/Drains/Airways     Peripheral Intravenous Line                 Peripheral IV - Single Lumen 02/03/18 1715 Right Forearm 3 days         Peripheral IV - Single Lumen 02/05/18 1800 Right Hand 1 day                Physical Exam   Constitutional: She is oriented to person, place, and time. She appears well-developed and well-nourished.   HENT:   Head: Normocephalic and atraumatic.   Eyes: Conjunctivae and EOM are normal. Pupils are equal, round, and reactive to light.   Neck: Normal range of motion. Neck supple. No thyromegaly present.   Cardiovascular: Normal rate and regular rhythm.    No murmur  heard.  Pulmonary/Chest: Effort normal and breath sounds normal. No respiratory distress.   Abdominal: Soft. Bowel sounds are normal.   Musculoskeletal: She exhibits no edema.   Neurological: She is alert and oriented to person, place, and time.   Skin: Skin is warm and dry.   Psychiatric: She has a normal mood and affect. Her behavior is normal.       Significant Labs:   CMP   Recent Labs  Lab 02/05/18  0547 02/05/18 2119 02/06/18  0618    137 141   K 4.7 5.3* 5.5*    104 106   CO2 26 23 25   * 235* 198*   BUN 31* 33* 35*   CREATININE 1.5* 1.2 1.1   CALCIUM 9.5 9.0 8.9   ANIONGAP 8 10 10   ESTGFRAFRICA 36* 47* 53*   EGFRNONAA 31* 41* 46*   , CBC   Recent Labs  Lab 02/05/18 0547 02/05/18 2119 02/06/18  0618   WBC 7.73  --  11.73   HGB 11.8* 11.9* 11.6*   HCT 36.5* 36.1* 35.3*     --  222   , INR No results for input(s): INR, PROTIME in the last 48 hours., Lipid Panel No results for input(s): CHOL, HDL, LDLCALC, TRIG, CHOLHDL in the last 48 hours. and Troponin No results for input(s): TROPONINI in the last 48 hours.    Significant Imaging: Echocardiogram:   2D echo with color flow doppler:   Results for orders placed or performed during the hospital encounter of 02/03/18   2D echo with color flow doppler   Result Value Ref Range    EF 55 55 - 65    Diastolic Dysfunction No     Aortic Valve Stenosis MILD (A)     Est. PA Systolic Pressure 29.21     Pericardial Effusion NONE     Tricuspid Valve Regurgitation MILD      Assessment and Plan:     * Closed fracture of left distal humerus    Status post ORIF  Management per ortho        SOB (shortness of breath)    Initial mild hypoxia now corrected  EF preserved with only mild aortic stenosis  Conservative therapy and discuss due to advanced age        Hypertension             Hyperlipidemia             Diabetes mellitus, type II    Per IM            VTE Risk Mitigation         Ordered     Medium Risk of VTE  Once      02/03/18 2125     [facility]  hose  Until discontinued      02/03/18 2125     Place sequential compression device  Until discontinued      02/03/18 2125          Thank you for your consult. I will follow-up with patient. Please contact us if you have any additional questions.    Yoni Wesley MD  Cardiology   [facility]

## 2018-02-07 NOTE — SUBJECTIVE & OBJECTIVE
Past Medical History:   Diagnosis Date    Cataract     Diabetes mellitus type II     Glaucoma     Hyperlipidemia     Hypertension     Osteoporosis        Past Surgical History:   Procedure Laterality Date    CHOLECYSTECTOMY      excision left axillary mass      excision thigh masses      EYE SURGERY      HEMORRHOID SURGERY      HYSTERECTOMY      VULVECTOMY         Review of patient's allergies indicates:   Allergen Reactions    Bactrim [sulfamethoxazole-trimethoprim]     Cephalexin Other (See Comments)     Leg cramps    Codeine Itching    Demerol [meperidine]     Etodolac Diarrhea    Metformin Diarrhea    Naproxen     Sulfur     Terbinafine     Darvocet a500 [propoxyphene n-acetaminophen] Rash       No current facility-administered medications on file prior to encounter.      Current Outpatient Prescriptions on File Prior to Encounter   Medication Sig    amlodipine (NORVASC) 10 MG tablet Take 1 tablet (10 mg total) by mouth once daily.    escitalopram oxalate (LEXAPRO) 10 MG tablet Take 1 tablet (10 mg total) by mouth once daily.    gabapentin (NEURONTIN) 100 MG capsule Take 100 mg by mouth 3 (three) times daily.    hydrocodone-acetaminophen 5-325mg (NORCO) 5-325 mg per tablet Take 1 tablet by mouth every 12 (twelve) hours as needed for Pain. Severe pain    LANTUS SOLOSTAR 100 unit/mL (3 mL) InPn pen inject 32 UNITS IN THE MORNING (Patient taking differently: inject 48 UNITS IN THE evening)    latanoprost (XALATAN) 0.005 % ophthalmic solution Place 1 drop into both eyes every evening.      metoprolol tartrate (LOPRESSOR) 25 MG tablet Take 1 tablet (25 mg total) by mouth 2 (two) times daily.    ACCU-CHEK FASTCLIX Mis     alendronate (FOSAMAX) 70 MG tablet     blood sugar diagnostic (ACCU-CHEK SMARTVIEW TEST STRIP) Strp use with insulin AS DIRECTED as directed    blood sugar diagnostic Strp test TWO TO THREE TIMES DAILY    blood-glucose meter (ACCU-CHEK DORCAS) Misc Please provide  "glucometer covered by the insurance company    clindamycin (CLEOCIN) 150 MG capsule Take 2 capsules (300 mg total) by mouth every 6 (six) hours.    desonide 0.05% (DESOWEN) 0.05 % Oint VICTOR HUGO AA BID RASH    dextromethorphan-guaifenesin  mg (MUCINEX DM)  mg per 12 hr tablet Take 1 tablet by mouth every 12 (twelve) hours.    EASY TOUCH TWIST LANCETS 32 gauge Misc test DAILY AS DIRECTED    fluconazole (DIFLUCAN) 200 MG Tab Take 1 tablet (200 mg total) by mouth once daily.    fluocinonide (LIDEX) 0.05 % ointment 1 application 2 (two) times daily. Apply to affected area    FREESTYLE LANCETS 28 gauge lancets USE DAILY    glipiZIDE (GLUCOTROL) 10 MG tablet Take 1 tablet (10 mg total) by mouth 2 (two) times daily with meals.    insulin needles, disposable, (NOVOFINE 32) 32 x 1/4 " Ndle Inject 1 Units into the skin once daily.    lancets (FREESTYLE LANCETS) 28 gauge Misc Inject 1 lancet into the skin once daily.    lisinopril (PRINIVIL,ZESTRIL) 40 MG tablet Take 1 tablet (40 mg total) by mouth once daily.    miconazole (MICOTIN) 2 % cream Apply topically 2 (two) times daily.    naproxen (NAPROSYN) 500 MG tablet Take 1 tablet (500 mg total) by mouth 2 (two) times daily with meals.    nitrofurantoin (MACRODANTIN) 100 MG capsule Take 100 mg by mouth 4 (four) times daily.    NOVOFINE 32 32 gauge x 1/4" Ndle Inject 1 Units into the skin once daily.    NOVOFINE 32 32 gauge x 1/4" Ndle Inject 1 Units into the skin once daily.    nystatin (MYCOSTATIN) ointment Apply topically 2 (two) times daily.    polyethylene glycol (GLYCOLAX) 17 gram/dose powder     simvastatin (ZOCOR) 40 MG tablet TAKE 1 TABLET BY MOUTH EVERY EVENING    tizanidine 2 mg Cap Take by mouth.    triamcinolone acetonide 0.1% (KENALOG) 0.1 % ointment     TRUE METRIX GLUCOSE TEST STRIP Strp TEST TWO TO THREE TIMES DAILY    TRUEPLUS LANCETS 30 gauge Misc TEST TWO TO THREE TIMES DAILY     Family History     Problem Relation (Age of Onset) "    Diabetes Mother, Son        Social History Main Topics    Smoking status: Never Smoker    Smokeless tobacco: Never Used    Alcohol use No    Drug use: No    Sexual activity: Not Currently     Review of Systems   Constitution: Negative.   HENT: Negative.    Eyes: Negative.    Cardiovascular: Negative for chest pain, dyspnea on exertion, irregular heartbeat, leg swelling, near-syncope, orthopnea, palpitations, paroxysmal nocturnal dyspnea and syncope.   Respiratory: Negative for shortness of breath.    Skin: Negative.    Musculoskeletal: Positive for falls, joint pain and joint swelling.   Gastrointestinal: Negative for abdominal pain, constipation and diarrhea.   Genitourinary: Negative for dysuria.   Neurological: Negative.    Psychiatric/Behavioral: Negative.      Objective:     Vital Signs (Most Recent):  Temp: 97 °F (36.1 °C) (02/06/18 1735)  Pulse: 104 (02/06/18 1735)  Resp: 16 (02/06/18 1735)  BP: (!) 145/65 (02/06/18 1735)  SpO2: 95 % (02/06/18 1735) Vital Signs (24h Range):  Temp:  [97 °F (36.1 °C)-99.7 °F (37.6 °C)] 97 °F (36.1 °C)  Pulse:  [] 104  Resp:  [15-22] 16  SpO2:  [87 %-99 %] 95 %  BP: (129-183)/(60-97) 145/65     Weight: 74.8 kg (164 lb 14.5 oz)  Body mass index is 34.47 kg/m².    SpO2: 95 %  O2 Device (Oxygen Therapy): room air      Intake/Output Summary (Last 24 hours) at 02/06/18 1903  Last data filed at 02/06/18 1700   Gross per 24 hour   Intake          2668.75 ml   Output               50 ml   Net          2618.75 ml       Lines/Drains/Airways     Peripheral Intravenous Line                 Peripheral IV - Single Lumen 02/03/18 1715 Right Forearm 3 days         Peripheral IV - Single Lumen 02/05/18 1800 Right Hand 1 day                Physical Exam   Constitutional: She is oriented to person, place, and time. She appears well-developed and well-nourished.   HENT:   Head: Normocephalic and atraumatic.   Eyes: Conjunctivae and EOM are normal. Pupils are equal, round, and reactive  to light.   Neck: Normal range of motion. Neck supple. No thyromegaly present.   Cardiovascular: Normal rate and regular rhythm.    No murmur heard.  Pulmonary/Chest: Effort normal and breath sounds normal. No respiratory distress.   Abdominal: Soft. Bowel sounds are normal.   Musculoskeletal: She exhibits no edema.   Neurological: She is alert and oriented to person, place, and time.   Skin: Skin is warm and dry.   Psychiatric: She has a normal mood and affect. Her behavior is normal.       Significant Labs:   CMP   Recent Labs  Lab 02/05/18 0547 02/05/18 2119 02/06/18 0618    137 141   K 4.7 5.3* 5.5*    104 106   CO2 26 23 25   * 235* 198*   BUN 31* 33* 35*   CREATININE 1.5* 1.2 1.1   CALCIUM 9.5 9.0 8.9   ANIONGAP 8 10 10   ESTGFRAFRICA 36* 47* 53*   EGFRNONAA 31* 41* 46*   , CBC   Recent Labs  Lab 02/05/18 0547 02/05/18 2119 02/06/18 0618   WBC 7.73  --  11.73   HGB 11.8* 11.9* 11.6*   HCT 36.5* 36.1* 35.3*     --  222   , INR No results for input(s): INR, PROTIME in the last 48 hours., Lipid Panel No results for input(s): CHOL, HDL, LDLCALC, TRIG, CHOLHDL in the last 48 hours. and Troponin No results for input(s): TROPONINI in the last 48 hours.    Significant Imaging: Echocardiogram:   2D echo with color flow doppler:   Results for orders placed or performed during the hospital encounter of 02/03/18   2D echo with color flow doppler   Result Value Ref Range    EF 55 55 - 65    Diastolic Dysfunction No     Aortic Valve Stenosis MILD (A)     Est. PA Systolic Pressure 29.21     Pericardial Effusion NONE     Tricuspid Valve Regurgitation MILD

## 2018-02-07 NOTE — CONSULTS
Ortho Daily Progress Note    Ngoc Henderson is a 86 y.o. female admitted on 2/3/2018      Chief Complaint/Reason for admission: Fall (Pt here via EMS for trip and fall, possible arm fracture-swelling noted. pt reports pain 10/10. Pt reports hitting head, denies any LOC, blurry vision. )       Hospital Day: 4  Post Op Day: 2 Days Post-Op     The patient was seen and examined this morning at the bedside.n Pt with dementia. She appears comfortable in bed._______________    Vitals:    02/07/18 0421 02/07/18 0800 02/07/18 0825 02/07/18 1204   BP: 130/62  (!) 112/54 (!) 116/57   Pulse: 95  104 89   Resp: 18  18 19   Temp: 98.6 °F (37 °C)  98.8 °F (37.1 °C) 97.8 °F (36.6 °C)   TempSrc: Oral  Oral Oral   SpO2: (!) 93% (!) 93% 95% 98%   Weight:       Height:           Vital Signs (Most Recent)  Temp: 97.8 °F (36.6 °C) (02/07/18 1204)  Pulse: 89 (02/07/18 1204)  Resp: 19 (02/07/18 1204)  BP: (!) 116/57 (02/07/18 1204)  SpO2: 98 % (02/07/18 1204)    Vital Signs Range (Last 24H):  Temp:  [97 °F (36.1 °C)-99 °F (37.2 °C)]   Pulse:  []   Resp:  [16-19]   BP: (112-145)/(54-65)   SpO2:  [91 %-98 %]       Physical:    Baseline dementia  Incision/ dressing clean/dry/intact  NVI Distally  Palpable distal pulses  CR<3sec      Recent Labs      02/05/18   0547  02/05/18   2119  02/06/18   0618  02/07/18   0619   K  4.7  5.3*  5.5*  4.9   CALCIUM  9.5  9.0  8.9  8.8   WBC  7.73   --   11.73  10.09   HGB  11.8*  11.9*  11.6*  10.5*   HCT  36.5*  36.1*  35.3*  32.3*   PLT  219   --   222  228       I/O last 3 completed shifts:  In: 4136.3 [P.O.:540; I.V.:3446.3; IV Piggyback:150]  Out: 50 [Blood:50]          Assessment:  A/P POD 2 s/p left distal humerus ORIF               Plan:    PT/OT  Pain Control    OK to DC back to nursing home from ortho standpoint  F/U in clinic in 2 weeks     Discharge Plan: Nursing home        Zack Faria MD  [facility]

## 2018-02-08 LAB
ANION GAP SERPL CALC-SCNC: 9 MMOL/L
BACTERIA UR CULT: NORMAL
BACTERIA UR CULT: NORMAL
BASOPHILS # BLD AUTO: 0.03 K/UL
BASOPHILS NFR BLD: 0.4 %
BUN SERPL-MCNC: 26 MG/DL
CALCIUM SERPL-MCNC: 8.9 MG/DL
CHLORIDE SERPL-SCNC: 111 MMOL/L
CO2 SERPL-SCNC: 25 MMOL/L
CREAT SERPL-MCNC: 0.9 MG/DL
DIFFERENTIAL METHOD: ABNORMAL
EOSINOPHIL # BLD AUTO: 0.2 K/UL
EOSINOPHIL NFR BLD: 3 %
ERYTHROCYTE [DISTWIDTH] IN BLOOD BY AUTOMATED COUNT: 13.5 %
EST. GFR  (AFRICAN AMERICAN): >60 ML/MIN/1.73 M^2
EST. GFR  (NON AFRICAN AMERICAN): 58 ML/MIN/1.73 M^2
GLUCOSE SERPL-MCNC: 195 MG/DL
HCT VFR BLD AUTO: 31.4 %
HGB BLD-MCNC: 10 G/DL
LYMPHOCYTES # BLD AUTO: 1.4 K/UL
LYMPHOCYTES NFR BLD: 18.1 %
MCH RBC QN AUTO: 31 PG
MCHC RBC AUTO-ENTMCNC: 31.8 G/DL
MCV RBC AUTO: 97 FL
MONOCYTES # BLD AUTO: 1 K/UL
MONOCYTES NFR BLD: 13.4 %
NEUTROPHILS # BLD AUTO: 5 K/UL
NEUTROPHILS NFR BLD: 65.1 %
PLATELET # BLD AUTO: 229 K/UL
PMV BLD AUTO: 9.1 FL
POCT GLUCOSE: 197 MG/DL (ref 70–110)
POCT GLUCOSE: 241 MG/DL (ref 70–110)
POCT GLUCOSE: 289 MG/DL (ref 70–110)
POCT GLUCOSE: 296 MG/DL (ref 70–110)
POTASSIUM SERPL-SCNC: 4.8 MMOL/L
RBC # BLD AUTO: 3.23 M/UL
SODIUM SERPL-SCNC: 145 MMOL/L
WBC # BLD AUTO: 7.64 K/UL

## 2018-02-08 PROCEDURE — 80048 BASIC METABOLIC PNL TOTAL CA: CPT

## 2018-02-08 PROCEDURE — G8979 MOBILITY GOAL STATUS: HCPCS | Mod: CK

## 2018-02-08 PROCEDURE — 25000003 PHARM REV CODE 250: Performed by: ORTHOPAEDIC SURGERY

## 2018-02-08 PROCEDURE — G8980 MOBILITY D/C STATUS: HCPCS | Mod: CL

## 2018-02-08 PROCEDURE — 36415 COLL VENOUS BLD VENIPUNCTURE: CPT

## 2018-02-08 PROCEDURE — 97535 SELF CARE MNGMENT TRAINING: CPT

## 2018-02-08 PROCEDURE — S0077 INJECTION, CLINDAMYCIN PHOSP: HCPCS | Performed by: ORTHOPAEDIC SURGERY

## 2018-02-08 PROCEDURE — 25000003 PHARM REV CODE 250: Performed by: EMERGENCY MEDICINE

## 2018-02-08 PROCEDURE — A4216 STERILE WATER/SALINE, 10 ML: HCPCS | Performed by: EMERGENCY MEDICINE

## 2018-02-08 PROCEDURE — 63600175 PHARM REV CODE 636 W HCPCS: Performed by: ORTHOPAEDIC SURGERY

## 2018-02-08 PROCEDURE — 11000001 HC ACUTE MED/SURG PRIVATE ROOM

## 2018-02-08 PROCEDURE — 97116 GAIT TRAINING THERAPY: CPT

## 2018-02-08 PROCEDURE — 25000003 PHARM REV CODE 250: Performed by: INTERNAL MEDICINE

## 2018-02-08 PROCEDURE — 85025 COMPLETE CBC W/AUTO DIFF WBC: CPT

## 2018-02-08 PROCEDURE — G8978 MOBILITY CURRENT STATUS: HCPCS | Mod: CL

## 2018-02-08 PROCEDURE — S0028 INJECTION, FAMOTIDINE, 20 MG: HCPCS | Performed by: EMERGENCY MEDICINE

## 2018-02-08 RX ADMIN — CIPROFLOXACIN HYDROCHLORIDE 500 MG: 500 TABLET, FILM COATED ORAL at 09:02

## 2018-02-08 RX ADMIN — METOPROLOL TARTRATE 25 MG: 25 TABLET, FILM COATED ORAL at 10:02

## 2018-02-08 RX ADMIN — DOCUSATE SODIUM 100 MG: 100 CAPSULE, LIQUID FILLED ORAL at 10:02

## 2018-02-08 RX ADMIN — AMLODIPINE BESYLATE 10 MG: 5 TABLET ORAL at 09:02

## 2018-02-08 RX ADMIN — CLINDAMYCIN IN 5 PERCENT DEXTROSE 900 MG: 18 INJECTION, SOLUTION INTRAVENOUS at 10:02

## 2018-02-08 RX ADMIN — HYDROMORPHONE HYDROCHLORIDE 0.5 MG: 2 INJECTION, SOLUTION INTRAMUSCULAR; INTRAVENOUS; SUBCUTANEOUS at 04:02

## 2018-02-08 RX ADMIN — Medication 3 ML: at 06:02

## 2018-02-08 RX ADMIN — LISINOPRIL 20 MG: 20 TABLET ORAL at 09:02

## 2018-02-08 RX ADMIN — SODIUM CHLORIDE: 0.9 INJECTION, SOLUTION INTRAVENOUS at 10:02

## 2018-02-08 RX ADMIN — Medication 3 ML: at 10:02

## 2018-02-08 RX ADMIN — FAMOTIDINE 20 MG: 10 INJECTION, SOLUTION INTRAVENOUS at 10:02

## 2018-02-08 RX ADMIN — INSULIN ASPART 2 UNITS: 100 INJECTION, SOLUTION INTRAVENOUS; SUBCUTANEOUS at 05:02

## 2018-02-08 RX ADMIN — CIPROFLOXACIN HYDROCHLORIDE 500 MG: 500 TABLET, FILM COATED ORAL at 10:02

## 2018-02-08 RX ADMIN — INSULIN ASPART 3 UNITS: 100 INJECTION, SOLUTION INTRAVENOUS; SUBCUTANEOUS at 12:02

## 2018-02-08 RX ADMIN — CLINDAMYCIN IN 5 PERCENT DEXTROSE 900 MG: 18 INJECTION, SOLUTION INTRAVENOUS at 06:02

## 2018-02-08 RX ADMIN — LATANOPROST 1 DROP: 50 SOLUTION/ DROPS OPHTHALMIC at 10:02

## 2018-02-08 RX ADMIN — INSULIN ASPART 4 UNITS: 100 INJECTION, SOLUTION INTRAVENOUS; SUBCUTANEOUS at 10:02

## 2018-02-08 RX ADMIN — OXYCODONE HYDROCHLORIDE 10 MG: 5 TABLET ORAL at 10:02

## 2018-02-08 RX ADMIN — OXYCODONE HYDROCHLORIDE 10 MG: 5 TABLET ORAL at 11:02

## 2018-02-08 RX ADMIN — Medication 3 ML: at 02:02

## 2018-02-08 RX ADMIN — DOCUSATE SODIUM 100 MG: 100 CAPSULE, LIQUID FILLED ORAL at 09:02

## 2018-02-08 RX ADMIN — CLINDAMYCIN IN 5 PERCENT DEXTROSE 900 MG: 18 INJECTION, SOLUTION INTRAVENOUS at 03:02

## 2018-02-08 RX ADMIN — METOPROLOL TARTRATE 25 MG: 25 TABLET, FILM COATED ORAL at 09:02

## 2018-02-08 NOTE — PLAN OF CARE
Problem: Patient Care Overview  Goal: Plan of Care Review  Outcome: Ongoing (interventions implemented as appropriate)  Continue with IVAB and IVF. Turn q 2 hours. Left arm in half splint with ace wrap. More alert today. Medicated for pain as ordered with adequate pain relief. Blood sugar checks ac/hs and on sliding scale. SCD's on . Requires assistance with feeding and ADL's. Ortho on case. Discharge planning in progress.

## 2018-02-08 NOTE — PLAN OF CARE
Current Discharge Medication List      CONTINUE these medications which have NOT CHANGED    Details   amlodipine (NORVASC) 10 MG tablet Take 1 tablet (10 mg total) by mouth once daily.  Qty: 30 tablet, Refills: 11    Associated Diagnoses: Essential hypertension      atorvastatin (LIPITOR) 40 MG tablet Take 40 mg by mouth once daily.      canagliflozin (INVOKANA) 300 mg Tab tablet Take 300 mg by mouth once daily.      diphenhydrAMINE (BENADRYL) 25 mg capsule Take 25 mg by mouth every 6 (six) hours as needed for Itching.      docusate sodium (COLACE) 100 MG capsule Take 100 mg by mouth 2 (two) times daily.      escitalopram oxalate (LEXAPRO) 10 MG tablet Take 1 tablet (10 mg total) by mouth once daily.  Qty: 30 tablet, Refills: 2    Associated Diagnoses: Anxiety disorder due to general medical condition; Depressed mood      gabapentin (NEURONTIN) 100 MG capsule Take 100 mg by mouth 3 (three) times daily.      hydrocodone-acetaminophen 5-325mg (NORCO) 5-325 mg per tablet Take 1 tablet by mouth every 12 (twelve) hours as needed for Pain. Severe pain  Qty: 5 tablet, Refills: 0      insulin aspart (NOVOLOG) 100 unit/mL injection Inject 20 Units into the skin 3 (three) times daily before meals.      LANTUS SOLOSTAR 100 unit/mL (3 mL) InPn pen inject 32 UNITS IN THE MORNING  Qty: 9 mL, Refills: 11    Associated Diagnoses: Uncontrolled type 2 diabetes mellitus with diabetic neuropathy, with long-term current use of insulin      latanoprost (XALATAN) 0.005 % ophthalmic solution Place 1 drop into both eyes every evening.        linaclotide (LINZESS) 72 mcg Cap Take by mouth.      !! lisinopril (PRINIVIL,ZESTRIL) 20 MG tablet Take 20 mg by mouth once daily.      metoprolol tartrate (LOPRESSOR) 25 MG tablet Take 1 tablet (25 mg total) by mouth 2 (two) times daily.  Qty: 60 tablet, Refills: 11    Associated Diagnoses: Essential hypertension      omeprazole (PRILOSEC) 40 MG capsule Take 40 mg by mouth once daily.      !! ACCU-CHEK  "FASTCLIX Misc Refills: 5      alendronate (FOSAMAX) 70 MG tablet Refills: 6      !! blood sugar diagnostic (ACCU-CHEK SMARTVIEW TEST STRIP) Strp use with insulin AS DIRECTED as directed  Qty: 100 strip, Refills: 7    Associated Diagnoses: Type 2 diabetes mellitus without complication      !! blood sugar diagnostic Strp test TWO TO THREE TIMES DAILY  Refills: 0      blood-glucose meter (ACCU-CHEK DORCAS) Misc Please provide glucometer covered by the insurance company  Qty: 1 each, Refills: 1    Associated Diagnoses: DM type 2, uncontrolled, with neuropathy      clindamycin (CLEOCIN) 150 MG capsule Take 2 capsules (300 mg total) by mouth every 6 (six) hours.  Qty: 56 capsule, Refills: 0    Associated Diagnoses: Wound cellulitis      desonide 0.05% (DESOWEN) 0.05 % Oint VICTOR HUGO AA BID RASH  Refills: 1      dextromethorphan-guaifenesin  mg (MUCINEX DM)  mg per 12 hr tablet Take 1 tablet by mouth every 12 (twelve) hours.      !! EASY TOUCH TWIST LANCETS 32 gauge Misc test DAILY AS DIRECTED  Refills: 7      fluconazole (DIFLUCAN) 200 MG Tab Take 1 tablet (200 mg total) by mouth once daily.  Qty: 2 tablet, Refills: 0      fluocinonide (LIDEX) 0.05 % ointment 1 application 2 (two) times daily. Apply to affected area  Refills: 0      !! FREESTYLE LANCETS 28 gauge lancets USE DAILY  Qty: 100 each, Refills: 5    Associated Diagnoses: Type II or unspecified type diabetes mellitus without mention of complication, not stated as uncontrolled      glipiZIDE (GLUCOTROL) 10 MG tablet Take 1 tablet (10 mg total) by mouth 2 (two) times daily with meals.  Qty: 180 tablet, Refills: 3    Associated Diagnoses: Type 2 diabetes mellitus with diabetic polyneuropathy, with long-term current use of insulin      !! insulin needles, disposable, (NOVOFINE 32) 32 x 1/4 " Ndle Inject 1 Units into the skin once daily.  Qty: 100 each, Refills: 1    Associated Diagnoses: Diabetes mellitus, type II      !! lancets (FREESTYLE LANCETS) 28 gauge Misc " "Inject 1 lancet into the skin once daily.  Qty: 100 each, Refills: 1      !! lisinopril (PRINIVIL,ZESTRIL) 40 MG tablet Take 1 tablet (40 mg total) by mouth once daily.  Qty: 90 tablet, Refills: 3    Associated Diagnoses: Essential hypertension      miconazole (MICOTIN) 2 % cream Apply topically 2 (two) times daily.  Qty: 30 g, Refills: 0      naproxen (NAPROSYN) 500 MG tablet Take 1 tablet (500 mg total) by mouth 2 (two) times daily with meals.  Qty: 60 tablet, Refills: 2    Associated Diagnoses: Primary osteoarthritis involving multiple joints; Spondylosis of lumbar region without myelopathy or radiculopathy      nitrofurantoin (MACRODANTIN) 100 MG capsule Take 100 mg by mouth 4 (four) times daily.      !! NOVOFINE 32 32 gauge x 1/4" Ndle Inject 1 Units into the skin once daily.  Qty: 100 each, Refills: 0    Associated Diagnoses: Diabetes mellitus, type II      !! NOVOFINE 32 32 gauge x 1/4" Ndle Inject 1 Units into the skin once daily.  Qty: 100 each, Refills: 1    Associated Diagnoses: Diabetes mellitus, type II      nystatin (MYCOSTATIN) ointment Apply topically 2 (two) times daily.  Qty: 15 g, Refills: 0      polyethylene glycol (GLYCOLAX) 17 gram/dose powder Refills: 0      simvastatin (ZOCOR) 40 MG tablet TAKE 1 TABLET BY MOUTH EVERY EVENING  Qty: 30 tablet, Refills: 1    Associated Diagnoses: Hyperlipidemia LDL goal <100      tizanidine 2 mg Cap Take by mouth.      triamcinolone acetonide 0.1% (KENALOG) 0.1 % ointment Refills: 0      !! TRUE METRIX GLUCOSE TEST STRIP Strp TEST TWO TO THREE TIMES DAILY  Qty: 50 strip, Refills: 9    Associated Diagnoses: Uncontrolled type 2 diabetes mellitus with diabetic polyneuropathy, with long-term current use of insulin      !! TRUEPLUS LANCETS 30 gauge Misc TEST TWO TO THREE TIMES DAILY  Refills: 0       !! - Potential duplicate medications found. Please discuss with provider.        "

## 2018-02-08 NOTE — PROGRESS NOTES
Progress Note    Admit Date: 2/3/2018   LOS: 5 days     SUBJECTIVE:     Follow-up For:  Left humerus fracture    02/08/2018: Denies fever or chills. Mental status better   02/07/2018: some confusion. Tolerating current tx   02/06/2018: Underwent ORIF to LUE. Moderate pain   02/05/2018: left UE pain moderate. Got it splinted yesterday. No fever or chills. +dysuria and constipation      Scheduled Meds:     amLODIPine  10 mg Oral Daily    ciprofloxacin HCl  500 mg Oral Q12H    clindamycin (CLEOCIN) IVPB  900 mg Intravenous Q8H    docusate sodium  100 mg Oral BID    famotidine (PF)  20 mg Intravenous Q12H    latanoprost  1 drop Both Eyes QHS    lisinopril  20 mg Oral Daily    metoprolol tartrate  25 mg Oral BID    sodium chloride 0.9%  3 mL Intravenous Q8H     Continuous Infusions:   sodium chloride 0.9% 75 mL/hr at 02/07/18 1513     PRN Meds:acetaminophen, acetaminophen, dextrose 50%, dextrose 50%, glucagon (human recombinant), glucose, glucose, HYDROmorphone, insulin aspart, ondansetron, ondansetron, oxyCODONE, ramelteon, vancomycin (VANCOCIN) IVPB    Review of patient's allergies indicates:   Allergen Reactions    Bactrim [sulfamethoxazole-trimethoprim]     Cephalexin Other (See Comments)     Leg cramps    Codeine Itching    Demerol [meperidine]     Etodolac Diarrhea    Metformin Diarrhea    Naproxen     Sulfur     Terbinafine     Darvocet a500 [propoxyphene n-acetaminophen] Rash       Review of Systems    Constitutional: moderate pain.  Eyes: no visual changes   ENT: no nasal congestion. Denies sore throat with odynophagia   Respiratory: No cough or sob    Cardiovascular: no chest pain or palpitations   Gastrointestinal: + constipation  Hematologic/Lymphatic: No lymphadenopathy, no significant fatigue, fever or night sweat. No mucocutaneous bleeding   Musculoskeletal: see HPI  Neurological: ? Confusion   Skin: No rashes or lesions  Psych: chronic anxiety     OBJECTIVE:     Vital Signs (Most  Recent)    Temp: 97.7 °F (36.5 °C) (02/08/18 0323)  Pulse: 102 (02/08/18 0323)  Resp: 18 (02/08/18 0323)  BP: (!) 128/58 (02/08/18 0323)  SpO2: (!) 91 % (02/08/18 0323)    Vital Signs Range (Last 24H):  Temp:  [96.3 °F (35.7 °C)-98.8 °F (37.1 °C)]   Pulse:  []   Resp:  [18-20]   BP: (111-135)/(54-61)   SpO2:  [91 %-98 %]     I & O (Last 24H):    Intake/Output Summary (Last 24 hours) at 02/08/18 0738  Last data filed at 02/07/18 1600   Gross per 24 hour   Intake            842.5 ml   Output                0 ml   Net            842.5 ml     Physical Exam:    General: NAD, resting in bed.    Head: normocephalic, atraumatic   Eyes: conjunctivae pink, anicteric sclera.    Throat: No erythema or post nasal discharge   Neck: supple, no LAD   Lungs: decreased BS at the bases    Heart: S1, S2, RRR   Abdomen: + BS x 4 quadrants, soft, non tender. No hepatosplenomegaly.   Extremities: Left UE in splint and ace wrap. ROM limited    Skin: turgor normal, no erythema or rashes. No abnormal moles  MS: LUE ROM limited with pain.   Neuro: awake, oriented to first name only    Psy: calm      Laboratory:  CBC:     Recent Labs  Lab 02/08/18 0437   WBC 7.64   RBC 3.23*   HGB 10.0*   HCT 31.4*      MCV 97   MCH 31.0   MCHC 31.8*     CMP:     Recent Labs  Lab 02/08/18 0437   *   CALCIUM 8.9      K 4.8   CO2 25   *   BUN 26*   CREATININE 0.9     Coagulation:     Recent Labs  Lab 02/03/18  1715   LABPROT 10.1   INR 1.0     Cardiac markers:     Recent Labs  Lab 02/04/18  0430   TROPONINI <0.006     ABGs: No results for input(s): PH, PCO2, PO2, HCO3, POCSATURATED, BE in the last 168 hours.  Microbiology Results (last 7 days)     Procedure Component Value Units Date/Time    Urine culture [[id number]] Collected:  02/05/18 2250    Order Status:  Completed Specimen:  Urine from Urine, Clean Catch Updated:  02/07/18 0912     Urine Culture, Routine --     PRESUMPTIVE E COLI  > 100,000 cfu/ml  Identification and  susceptibility pending          Specimen (12h ago through future)    None          Recent Labs  Lab 02/05/18  2250   COLORU Yellow   SPECGRAV 1.020   PHUR 5.0   PROTEINUA Negative   BACTERIA Moderate*   NITRITE Positive*   LEUKOCYTESUR 3+*   UROBILINOGEN Negative       Diagnostic Results:    CONCLUSIONS     1 - TDS    2 - Normal left ventricular systolic function (EF 55-60%).     3 - Mild aortic stenosis, CHERI = 1.77 cm2, AVAi = 1.06 cm2/m2, peak velocity = 2.41 m/s, mean gradient = 13 mmHg.             This document has been electronically    SIGNED BY: Yoni Wesley MD On: 02/06/2018 15:35            ASSESSMENT/PLAN:     Active Hospital Problems     Diagnosis   POA    *Closed fracture of left distal humerus [S42.402A]- after a slip and fall accident at daughter's home  - pt has acceptable cardiac risk for this medically necessary surgical intervention     -  2-D Echo reviewed    - continue Beta blocker tx  - tolerated ORIF-HUMERUS  [employer] (Left)    -await final reccs to dc back to NH with SNF       Yes    Acute pain [R52]  - hydromorphone prn     Yes    Fall [W19.XXXA]- high risk of fall again    - keep bed low      Yes    Hypoxia [R09.02]  - cxr no acute pathology   - 2- D Echo ; EF NL  - cards seen pt      Yes    Osteoporosis, 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      DVT prophylaxis: SCDs     Dysuria: Abnormal UA   - culture - E.coli    - Cipro     Confusional state: pt high risk for delirium an fall    - close monitoring   - improving     Discussed with daughter, Ms. Nahed Mathews on 02/07.     Hector Saleh M.D  Internal Medicine & Geriatric Medicine  Hematology & Oncology  Palliative Medicine    [address]  [place], [address]  [place] [address]  [phone] (Office)  [phone] (Fax)

## 2018-02-08 NOTE — PLAN OF CARE
[facility]    FACILITY TRANSFER ORDERS      Patient Name: Ngoc Henderson  YOB: 1931    PCP: Hector Saleh MD   PCP Address: [facility] [address] [facility] [address] / [place]  PCP Phone Number: [phone]  PCP Fax: [phone]    Encounter Date: 02/09/2018    Admit to: [facility]     Vital Signs: Per Facility Protocol     Diagnoses:   Active Hospital Problems    Diagnosis  POA    *Closed fracture of left distal humerus [S42.402A]  Yes    Acute pain [R52]  Yes    Fall [W19.XXXA]  Yes    Hypoxia [R09.02]  Yes    Osteoporosis, postmenopausal [M81.0]  Yes    Diabetes mellitus, type II [E11.9]  Yes      Resolved Hospital Problems    Diagnosis Date Resolved POA   No resolved problems to display.       Allergies:  Review of patient's allergies indicates:   Allergen Reactions    Bactrim [sulfamethoxazole-trimethoprim]     Cephalexin Other (See Comments)     Leg cramps    Codeine Itching    Demerol [meperidine]     Etodolac Diarrhea    Metformin Diarrhea    Naproxen     Sulfur     Terbinafine     Darvocet a500 [propoxyphene n-acetaminophen] Rash       Diet: Diabetic 1800 Calorie     Activities: PT/OT evaluate and treat     Nursing: Neuro checks q 4 hours     Labs: Per facility Protocol       Medications: Review discharge medications with patient and family and provide education.       Ngoc Henderson   Home Medication Instructions JAIME:[id number]    Printed on:02/09/18 0816   Medication Information                      ACCU-CHEK FASTCLIX Misc               acetaminophen (TYLENOL) 325 MG tablet  Take 2 tablets (650 mg total) by mouth every 8 (eight) hours as needed.             alendronate (FOSAMAX) 70 MG tablet               amlodipine (NORVASC) 10 MG tablet  Take 1 tablet (10 mg total) by mouth once daily.             atorvastatin (LIPITOR) 40 MG tablet  Take 40 mg by mouth once daily.             blood sugar diagnostic (ACCU-CHEK SMARTVIEW TEST STRIP) Strp  use with  "insulin AS DIRECTED as directed             blood sugar diagnostic Strp  test TWO TO THREE TIMES DAILY             blood-glucose meter (ACCU-CHEK DORCAS) Misc  Please provide glucometer covered by the insurance company             canagliflozin (INVOKANA) 300 mg Tab tablet  Take 300 mg by mouth once daily.             ciprofloxacin HCl (CIPRO) 500 MG tablet  Take 1 tablet (500 mg total) by mouth every 12 (twelve) hours. Stop date 02/14             desonide 0.05% (DESOWEN) 0.05 % Oint  VICTOR HUGO AA BID RASH             dextromethorphan-guaifenesin  mg (MUCINEX DM)  mg per 12 hr tablet  Take 1 tablet by mouth every 12 (twelve) hours.             docusate sodium (COLACE) 100 MG capsule  Take 100 mg by mouth 2 (two) times daily.             EASY TOUCH TWIST LANCETS 32 gauge Misc  test DAILY AS DIRECTED             escitalopram oxalate (LEXAPRO) 10 MG tablet  Take 1 tablet (10 mg total) by mouth once daily.             FREESTYLE LANCETS 28 gauge lancets  USE DAILY             gabapentin (NEURONTIN) 100 MG capsule  Take 100 mg by mouth 3 (three) times daily.             hydrocodone-acetaminophen 5-325mg (NORCO) 5-325 mg per tablet  Take 1 tablet by mouth every 8 (eight) hours as needed for Pain. Severe pain             insulin aspart (NOVOLOG) 100 unit/mL injection  Inject 20 Units into the skin 3 (three) times daily before meals.             insulin needles, disposable, (NOVOFINE 32) 32 x 1/4 " Ndle  Inject 1 Units into the skin once daily.             lancets (FREESTYLE LANCETS) 28 gauge Misc  Inject 1 lancet into the skin once daily.             LANTUS SOLOSTAR 100 unit/mL (3 mL) InPn pen  inject 32 UNITS IN THE MORNING             latanoprost (XALATAN) 0.005 % ophthalmic solution  Place 1 drop into both eyes every evening.               linaclotide (LINZESS) 72 mcg Cap  Take by mouth.             lisinopril (PRINIVIL,ZESTRIL) 40 MG tablet  Take 1 tablet (40 mg total) by mouth once daily.             metoprolol " "tartrate (LOPRESSOR) 25 MG tablet  Take 1 tablet (25 mg total) by mouth 2 (two) times daily.             miconazole (MICOTIN) 2 % cream  Apply topically 2 (two) times daily.             NOVOFINE 32 32 gauge x 1/4" Ndle  Inject 1 Units into the skin once daily.             polyethylene glycol (GLYCOLAX) 17 gram/dose powder               simvastatin (ZOCOR) 40 MG tablet  TAKE 1 TABLET BY MOUTH EVERY EVENING                   _________________________________  Hector Saleh MD   02/09/2018  "

## 2018-02-08 NOTE — PT/OT/SLP PROGRESS
Occupational Therapy   Treatment    Name: Ngoc Henderson  MRN: [id number]  Admitting Diagnosis:  Closed fracture of left distal humerus  3 Days Post-Op    Recommendations:     Discharge Recommendations: nursing facility, skilled  Discharge Equipment Recommendations:  none  Barriers to discharge:  None    Subjective     Communicated with: PT prior to session.  Pain/Comfort:  · Pain Rating 1: 0/10  · Pain Rating Post-Intervention 1: 0/10    Patients cultural, spiritual, Worship conflicts given the current situation:      Objective:     Patient found with: peripheral IV, SCD, oxygen    General Precautions: Standard, fall   Orthopedic Precautions:N/A   Braces: N/A     Occupational Performance:    Bed Mobility:    · Patient completed Rolling/Turning to Right with maximal assistance  · Patient completed Supine to Sit with maximal assistance  · Patient completed Sit to Supine with total assistance     Functional Mobility/Transfers:  · Patient completed Sit <> Stand Transfer with moderate assistance  with  quad cane   · Functional Mobility: no functional ambulation at this time    Activities of Daily Living:  · Feeding:  moderate assistance    · Grooming: moderate assistance    · UB Dressing: total assistance      Patient left supine with all lines intact, call button in reach and bed alarm on    AMPAC 6 Click:  AMPA Total Score: 8    Treatment & Education:  PROM to L shoulder with max support  Education:    Assessment:     Ngoc Henderson is a 86 y.o. female with a medical diagnosis of Closed fracture of left distal humerus.  She presents with fatigue during session.  Performance deficits affecting function are weakness, impaired endurance, impaired self care skills, impaired functional mobilty, gait instability, impaired balance, decreased coordination, decreased upper extremity function, decreased lower extremity function, decreased safety awareness, decreased ROM, impaired coordination, impaired fine motor, edema,  impaired cardiopulmonary response to activity, impaired muscle length.      Rehab Prognosis:  good; patient would benefit from acute skilled OT services to address these deficits and reach maximum level of function.       Plan:     Patient to be seen 5 x/week to address the above listed problems via self-care/home management, therapeutic activities, therapeutic exercises  · Plan of Care Expires:    · Plan of Care Reviewed with: patient    This Plan of care has been discussed with the patient who was involved in its development and understands and is in agreement with the identified goals and treatment plan    GOALS:    Occupational Therapy Goals        Problem: Occupational Therapy Goal    Goal Priority Disciplines Outcome Interventions   Occupational Therapy Goal     OT, PT/OT Ongoing (interventions implemented as appropriate)    Description:  Goals to be met by: 2/23/18    Patient will increase functional independence with ADLs by performing:    Feeding with Moderate Assistance.  UE Dressing with Moderate Assistance.  Grooming while seated with Minimal Assistance.  Toileting from bedside commode with Moderate Assistance for hygiene and clothing management.   Sitting at edge of bed x20 minutes with Contact Guard Assistance.  Stand pivot transfers with Moderate Assistance.  Upper extremity exercise program x5 reps per handout, with assistance as needed.                      Time Tracking:     OT Date of Treatment: 02/08/18  OT Start Time: 1534  OT Stop Time: 1559  OT Total Time (min): 25 min    Billable Minutes:Self Care/Home Management 10  Total Time 25 with co treatment with PT    Ese Avila OT  2/8/2018

## 2018-02-08 NOTE — PLAN OF CARE
Problem: Fall Risk (Adult)  Goal: Identify Related Risk Factors and Signs and Symptoms  Related risk factors and signs and symptoms are identified upon initiation of Human Response Clinical Practice Guideline (CPG)   Outcome: Ongoing (interventions implemented as appropriate)   02/08/18 0750   Fall Risk   Related Risk Factors (Fall Risk) age-related changes;confusion/agitation;fear of falling;history of falls   Signs and Symptoms (Fall Risk) (pain medication)       Problem: Pain, Acute (Adult)  Goal: Identify Related Risk Factors and Signs and Symptoms  Related risk factors and signs and symptoms are identified upon initiation of Human Response Clinical Practice Guideline (CPG)   Outcome: Ongoing (interventions implemented as appropriate)   02/08/18 0750   Pain, Acute   Related Risk Factors (Acute Pain) disease process;fear;patient perception;surgery;trauma;psychosocial factor   Signs and Symptoms (Acute Pain) fatigue/weakness;guarding/abnormal posturing/positioning;impaired thought process/concentration;moaning;verbalization of pain descriptors       Comments: Patient remains free from injury; for  Prevention of infections; iv abx continuing; s/p left arm ORIF, pain managed with medication regimen; arm remains in sling .skin intergrity; Perineal area noted with rash; dr. Saleh notified findings new order given.

## 2018-02-08 NOTE — PLAN OF CARE
Problem: Occupational Therapy Goal  Goal: Occupational Therapy Goal  Goals to be met by: 2/23/18    Patient will increase functional independence with ADLs by performing:    Feeding with Moderate Assistance.  UE Dressing with Moderate Assistance.  Grooming while seated with Minimal Assistance.  Toileting from bedside commode with Moderate Assistance for hygiene and clothing management.   Sitting at edge of bed x20 minutes with Contact Guard Assistance.  Stand pivot transfers with Moderate Assistance.  Upper extremity exercise program x5 reps per handout, with assistance as needed.     Outcome: Ongoing (interventions implemented as appropriate)  Pt. Is awating SNF    Comments: Pt able to tolerate sitting edge of bed with light grooming activity.

## 2018-02-08 NOTE — PT/OT/SLP PROGRESS
Physical Therapy Treatment    Patient Name:  Ngoc Henderson   MRN:  [id number]    Recommendations:     Discharge Recommendations:  nursing facility, skilled   Discharge Equipment Recommendations:  (TBD at NH)   Barriers to discharge: None    Assessment:     Ngoc Henderson is a 86 y.o. female admitted with a medical diagnosis of Closed fracture of left distal humerus.  She presents with the following impairments/functional limitations:  weakness, impaired endurance, impaired self care skills, impaired functional mobilty, impaired balance, impaired cognition, decreased coordination, decreased upper extremity function, decreased lower extremity function, decreased safety awareness, pain, decreased ROM, impaired skin, edema, impaired cardiopulmonary response to activity, orthopedic precautions.    Rehab Prognosis:  Fair+; patient would benefit from acute skilled PT services to address these deficits and reach maximum level of function.      Recent Surgery: Procedure(s) (LRB):  OPEN REDUCTION INTERNAL FIXATION-HUMERUS  [facility] (Left) 3 Days Post-Op    Plan:     During this hospitalization, patient to be seen daily to address the above listed problems via gait training, therapeutic activities, therapeutic exercises, wheelchair management/training  · Plan of Care Expires:  02/20/18   Plan of Care Reviewed with: patient    Subjective     Patient found in bed upon PT entry to room, agreeable to treatment.      Chief Complaint: Pt reported that she's tired.   Patient comments/goals: Pt did not state.   Pain/Comfort:  · Pain Rating 1:  (Pt c/o minimal LUE and RLE pain.)  · Pain Addressed 1: Pre-medicate for activity      Objective:     Patient found with: oxygen 2L, peripheral IV, SCD     General Precautions: Standard, fall, diabetic   Orthopedic Precautions:LUE non weight bearing   Brace: LUE sling    Functional Mobility:  Pt was asleep upon PT arrival.  Pt required mod VC's to stay alert and attentive to tasks.  Pt was  pleasant and cooperative.    · Bed Mobility:     · Bridging: minimum assistance  · Supine to Sit: dependence and of 2 persons  · Sit to Supine: dependence and of 2 persons  · Transfers:     · Sit to Stand:  minimum assistance with quad cane x 2 trials   · Gait: Pt ambulated ~3-4 sidesteps x 2 trials along bedside with mod-min A/QC.  Pt with decreased weight shifting, foot clearance, step length, and caitlyn.  Pt required mod A to manage QC.    · Balance: Pt with fair/fair+ sit balance and fair-/fair stand balance.  Pt with posterior LOB.        AM-PAC 6 CLICK MOBILITY  Turning over in bed (including adjusting bedclothes, sheets and blankets)?: 2  Sitting down on and standing up from a chair with arms (e.g., wheelchair, bedside commode, etc.): 3  Moving from lying on back to sitting on the side of the bed?: 2  Moving to and from a bed to a chair (including a wheelchair)?: 3  Need to walk in hospital room?: 2  Climbing 3-5 steps with a railing?: 1  Total Score: 13       Therapeutic Activities and Exercises:  Balance Training  Static Sitting/Standing:  Patient performed static standing on level surface  using QC with mod- Minimal Assistance and moderate verbal cues to correct posterior lean.     Dynamic Sitting/Standing:  Patient performed dynamic standing on level surface using QC with mod- Minimal Assistance and moderate verbal cues during minimal lateral weight shifting.      Patient left HOB elevated and LUE/BLE elevated on pillows with all lines intact, call button in reach, bed alarm on and nurse Carri notified.    GOALS:    Physical Therapy Goals        Problem: Physical Therapy Goal    Goal Priority Disciplines Outcome Goal Variances Interventions   Physical Therapy Goal     PT/OT, PT Ongoing (interventions implemented as appropriate)     Description:  Goals to be met by: 18     Patient will increase functional independence with mobility by performin. Supine to sit with Moderate Assistance  2. Sit  to supine with Moderate Assistance  3. Rolling to Left and Right with Moderate Assistance  4. Sitting at edge of bed x 30 minutes with Stand-by Assistance  5. Lower extremity exercise program x 20 reps per handout, with SBA  6. Sit to stand with CGA/QC  7. Bed to chair with min A/QC  8. Gait 20-30 ft with min A/QC                       Time Tracking:     PT Received On: 02/08/18  PT Start Time: 1535     PT Stop Time: 1559  PT Total Time (min): 24 min     Billable Minutes: Gait Training 12 min co-tx with OT    Treatment Type: Treatment  PT/PTA: PT     PTA Visit Number: 0     Catherine Venegas, PT  02/08/2018

## 2018-02-08 NOTE — PT/OT/SLP PROGRESS
Physical Therapy Treatment    Patient Name:  Ngoc Henderson   MRN:  [id number]    Recommendations:     Discharge Recommendations:  nursing facility, skilled   Discharge Equipment Recommendations:  (TBD at NH)   Barriers to discharge: None    Assessment:     Ngoc Henderson is a 86 y.o. female admitted with a medical diagnosis of Closed fracture of left distal humerus.  She presents with the following impairments/functional limitations:  weakness, impaired endurance, impaired self care skills, impaired functional mobilty, impaired balance, impaired cognition, decreased coordination, decreased upper extremity function, decreased lower extremity function, decreased safety awareness, pain, decreased ROM, impaired skin, edema, impaired cardiopulmonary response to activity, orthopedic precautions.    Rehab Prognosis:  Fair+; patient would benefit from acute skilled PT services to address these deficits and reach maximum level of function.      Recent Surgery: Procedure(s) (LRB):  OPEN REDUCTION INTERNAL FIXATION-HUMERUS  [facility] (Left) 2 Days Post-Op    Plan:     During this hospitalization, patient to be seen daily to address the above listed problems via gait training, therapeutic activities, therapeutic exercises, wheelchair management/training  · Plan of Care Expires:  02/20/18   Plan of Care Reviewed with: patient    Subjective     Patient found in bed upon PT entry to room, agreeable to treatment.      Chief Complaint: pain  Patient comments/goals: Pt reported that she will try.   Pain/Comfort:  · Pain Rating 1:  (Pt c/o pain in LUE with movement and pain in RLE 2* cramps. )  · Pain Addressed 1: Pre-medicate for activity      Objective:     Patient found with: oxygen 2L, peripheral IV, LUE sling     General Precautions: Standard, fall, diabetic   Orthopedic Precautions:LUE non weight bearing   Braces: LUE sling    Functional Mobility:  Pt with increased alertness and active participation.  Pt able to tolerate  seated EOB activities with CGA/SBA ~20 min.      · Bed Mobility:     · Rolling Right: dependence and of 2 persons  · Scooting: dependence and of 2 persons  · Bridging: dependence and of 2 persons  · Supine to Sit: dependence and of 2 persons  · Sit to Supine: dependence and of 2 persons  · Transfers:     · Sit to Stand:  moderate assistance and of 2 persons with quad cane x 3 trials   · Gait: Pt ambulated ~2-3 sidesteps along bedside x 2 trials with mod A/QC.  Pt with decreased weight shifting, foot clearance, step length, and caitlyn.  Pt required assistance with QC management.   · Balance: Pt with fair+ sit balance and fair stand balance.       AM-PAC 6 CLICK MOBILITY  Turning over in bed (including adjusting bedclothes, sheets and blankets)?: 2  Sitting down on and standing up from a chair with arms (e.g., wheelchair, bedside commode, etc.): 3  Moving from lying on back to sitting on the side of the bed?: 2  Moving to and from a bed to a chair (including a wheelchair)?: 3  Need to walk in hospital room?: 2  Climbing 3-5 steps with a railing?: 1  Total Score: 13       Therapeutic Activities and Exercises:  BLE seated therex 10 reps: hip flex, LAQ, and AP    Patient left HOB elevated and LUE/BLE elevated on pillow with all lines intact, call button in reach, bed alarm on and nurse Jennifer notified.    GOALS:    Physical Therapy Goals        Problem: Physical Therapy Goal    Goal Priority Disciplines Outcome Goal Variances Interventions   Physical Therapy Goal     PT/OT, PT      Description:  Goals to be met by: 18     Patient will increase functional independence with mobility by performin. Supine to sit with Moderate Assistance  2. Sit to supine with Moderate Assistance  3. Rolling to Left and Right with Moderate Assistance  4. Sitting at edge of bed x 30 minutes with Stand-by Assistance  5. Lower extremity exercise program x 20 reps per handout, with SBA                      Time Tracking:     PT  Received On: 02/07/18  PT Start Time: 1517     PT Stop Time: 1546  PT Total Time (min): 29 min     Billable Minutes: Gait Training 15 min co-tx with MUNSON    Treatment Type: Treatment  PT/PTA: PT     PTA Visit Number: 0     Catherine Venegas, PT   02/07/2018

## 2018-02-08 NOTE — PROGRESS NOTES
Ortho Daily Progress Note    Ngoc Henderson is a 86 y.o. female admitted on 2/3/2018      Chief Complaint/Reason for admission: Fall (Pt here via EMS for trip and fall, possible arm fracture-swelling noted. pt reports pain 10/10. Pt reports hitting head, denies any LOC, blurry vision. )       Hospital Day: 5  Post Op Day: 3 Days Post-Op     The patient was seen and examined this morning at the bedside. Patient reports no acute issues overnight.  Patient reports that pain is adequately controlled.    She is more alert today  _______________    Vitals:    02/07/18 2328 02/08/18 0323 02/08/18 0900 02/08/18 1147   BP: 135/61 (!) 128/58 (!) 161/70 139/60   Pulse: 96 102 110 98   Resp: 20 18 20 18   Temp: 98.8 °F (37.1 °C) 97.7 °F (36.5 °C) 97.7 °F (36.5 °C) 98.1 °F (36.7 °C)   TempSrc: Oral Oral  Oral   SpO2: 96% (!) 91% 96% 99%   Weight:       Height:           Vital Signs (Most Recent)  Temp: 98.1 °F (36.7 °C) (02/08/18 1147)  Pulse: 98 (02/08/18 1147)  Resp: 18 (02/08/18 1147)  BP: 139/60 (02/08/18 1147)  SpO2: 99 % (02/08/18 1147)    Vital Signs Range (Last 24H):  Temp:  [96.3 °F (35.7 °C)-98.8 °F (37.1 °C)]   Pulse:  []   Resp:  [18-20]   BP: (111-161)/(56-70)   SpO2:  [91 %-99 %]       Physical:    Baseline dementia  Incision/ dressing clean/dry/intact  NVI Distally  Palpable distal pulses  CR<3sec      Recent Labs      02/06/18   0618  02/07/18   0619  02/08/18   0437   K  5.5*  4.9  4.8   CALCIUM  8.9  8.8  8.9   WBC  11.73  10.09  7.64   HGB  11.6*  10.5*  10.0*   HCT  35.3*  32.3*  31.4*   PLT  222  228  229       I/O last 3 completed shifts:  In: 3520 [P.O.:720; I.V.:2650; IV Piggyback:150]  Out: -           Assessment:  A/P POD 3 s/p left   Distal humerus ORIF            Plan:    PT/OT: hand and edema control  Pain Control    Discharge Plan: will likely need wheelchair. She usually ambulated witht a walker and is NWB to [facility]    [facility] back [facility]. Ok for dc from [facility]        Zack  KATALINA Faria MD  [facility]

## 2018-02-08 NOTE — PLAN OF CARE
Problem: Physical Therapy Goal  Goal: Physical Therapy Goal  Goals to be met by: 18     Patient will increase functional independence with mobility by performin. Supine to sit with Moderate Assistance  2. Sit to supine with Moderate Assistance  3. Rolling to Left and Right with Moderate Assistance  4. Sitting at edge of bed x 30 minutes with Stand-by Assistance  5. Lower extremity exercise program x 20 reps per handout, with SBA  6. Sit to stand with CGA/QC  7. Bed to chair with min A/QC  8. Gait 20-30 ft with min A/QC     Outcome: Ongoing (interventions implemented as appropriate)  Pt with increased alertness and participation today, able to stand with assistance.

## 2018-02-08 NOTE — PLAN OF CARE
Problem: Physical Therapy Goal  Goal: Physical Therapy Goal  Goals to be met by: 18     Patient will increase functional independence with mobility by performin. Supine to sit with Moderate Assistance  2. Sit to supine with Moderate Assistance  3. Rolling to Left and Right with Moderate Assistance  4. Sitting at edge of bed x 30 minutes with Stand-by Assistance  5. Lower extremity exercise program x 20 reps per handout, with SBA  6. Sit to stand with CGA/QC  7. Bed to chair with min A/QC  8. Gait 20-30 ft with min A/QC      Outcome: Ongoing (interventions implemented as appropriate)  Pt able to take a few sidesteps along bedside with QC/assistance.

## 2018-02-09 VITALS
RESPIRATION RATE: 18 BRPM | DIASTOLIC BLOOD PRESSURE: 56 MMHG | BODY MASS INDEX: 34.61 KG/M2 | HEART RATE: 90 BPM | WEIGHT: 164.88 LBS | SYSTOLIC BLOOD PRESSURE: 120 MMHG | TEMPERATURE: 99 F | HEIGHT: 58 IN | OXYGEN SATURATION: 96 %

## 2018-02-09 LAB
ANION GAP SERPL CALC-SCNC: 11 MMOL/L
BASOPHILS # BLD AUTO: 0.03 K/UL
BASOPHILS NFR BLD: 0.4 %
BUN SERPL-MCNC: 16 MG/DL
CALCIUM SERPL-MCNC: 8.6 MG/DL
CHLORIDE SERPL-SCNC: 105 MMOL/L
CO2 SERPL-SCNC: 23 MMOL/L
CREAT SERPL-MCNC: 0.7 MG/DL
DIFFERENTIAL METHOD: ABNORMAL
EOSINOPHIL # BLD AUTO: 0.5 K/UL
EOSINOPHIL NFR BLD: 6.1 %
ERYTHROCYTE [DISTWIDTH] IN BLOOD BY AUTOMATED COUNT: 13.3 %
EST. GFR  (AFRICAN AMERICAN): >60 ML/MIN/1.73 M^2
EST. GFR  (NON AFRICAN AMERICAN): >60 ML/MIN/1.73 M^2
GLUCOSE SERPL-MCNC: 173 MG/DL
HCT VFR BLD AUTO: 31.4 %
HGB BLD-MCNC: 10.3 G/DL
LYMPHOCYTES # BLD AUTO: 2.1 K/UL
LYMPHOCYTES NFR BLD: 25.9 %
MCH RBC QN AUTO: 31.1 PG
MCHC RBC AUTO-ENTMCNC: 32.8 G/DL
MCV RBC AUTO: 95 FL
MONOCYTES # BLD AUTO: 1 K/UL
MONOCYTES NFR BLD: 12.6 %
NEUTROPHILS # BLD AUTO: 4.5 K/UL
NEUTROPHILS NFR BLD: 55 %
PLATELET # BLD AUTO: 239 K/UL
PMV BLD AUTO: 9.2 FL
POCT GLUCOSE: 212 MG/DL (ref 70–110)
POCT GLUCOSE: 231 MG/DL (ref 70–110)
POTASSIUM SERPL-SCNC: 4.4 MMOL/L
RBC # BLD AUTO: 3.31 M/UL
SODIUM SERPL-SCNC: 139 MMOL/L
WBC # BLD AUTO: 8.23 K/UL

## 2018-02-09 PROCEDURE — 85025 COMPLETE CBC W/AUTO DIFF WBC: CPT

## 2018-02-09 PROCEDURE — 25000003 PHARM REV CODE 250: Performed by: EMERGENCY MEDICINE

## 2018-02-09 PROCEDURE — 36415 COLL VENOUS BLD VENIPUNCTURE: CPT

## 2018-02-09 PROCEDURE — A4216 STERILE WATER/SALINE, 10 ML: HCPCS | Performed by: EMERGENCY MEDICINE

## 2018-02-09 PROCEDURE — S0077 INJECTION, CLINDAMYCIN PHOSP: HCPCS | Performed by: ORTHOPAEDIC SURGERY

## 2018-02-09 PROCEDURE — S0028 INJECTION, FAMOTIDINE, 20 MG: HCPCS | Performed by: EMERGENCY MEDICINE

## 2018-02-09 PROCEDURE — 80048 BASIC METABOLIC PNL TOTAL CA: CPT

## 2018-02-09 PROCEDURE — 94799 UNLISTED PULMONARY SVC/PX: CPT

## 2018-02-09 PROCEDURE — 63600175 PHARM REV CODE 636 W HCPCS: Performed by: ORTHOPAEDIC SURGERY

## 2018-02-09 PROCEDURE — 94761 N-INVAS EAR/PLS OXIMETRY MLT: CPT

## 2018-02-09 PROCEDURE — 25000003 PHARM REV CODE 250: Performed by: INTERNAL MEDICINE

## 2018-02-09 PROCEDURE — 25000003 PHARM REV CODE 250: Performed by: ORTHOPAEDIC SURGERY

## 2018-02-09 RX ORDER — ACETAMINOPHEN 325 MG/1
650 TABLET ORAL EVERY 8 HOURS PRN
Qty: 100 TABLET | Refills: 0 | Status: SHIPPED | OUTPATIENT
Start: 2018-02-09

## 2018-02-09 RX ORDER — HYDROCODONE BITARTRATE AND ACETAMINOPHEN 5; 325 MG/1; MG/1
1 TABLET ORAL EVERY 8 HOURS PRN
Qty: 40 TABLET | Refills: 0 | Status: SHIPPED | OUTPATIENT
Start: 2018-02-09

## 2018-02-09 RX ORDER — CIPROFLOXACIN 500 MG/1
500 TABLET ORAL EVERY 12 HOURS
Qty: 10 TABLET | Refills: 0 | Status: SHIPPED | OUTPATIENT
Start: 2018-02-09

## 2018-02-09 RX ADMIN — INSULIN ASPART 2 UNITS: 100 INJECTION, SOLUTION INTRAVENOUS; SUBCUTANEOUS at 08:02

## 2018-02-09 RX ADMIN — Medication 3 ML: at 05:02

## 2018-02-09 RX ADMIN — HYDROMORPHONE HYDROCHLORIDE 0.5 MG: 2 INJECTION, SOLUTION INTRAMUSCULAR; INTRAVENOUS; SUBCUTANEOUS at 12:02

## 2018-02-09 RX ADMIN — DOCUSATE SODIUM 100 MG: 100 CAPSULE, LIQUID FILLED ORAL at 08:02

## 2018-02-09 RX ADMIN — FAMOTIDINE 20 MG: 10 INJECTION, SOLUTION INTRAVENOUS at 08:02

## 2018-02-09 RX ADMIN — SODIUM CHLORIDE: 0.9 INJECTION, SOLUTION INTRAVENOUS at 12:02

## 2018-02-09 RX ADMIN — AMLODIPINE BESYLATE 10 MG: 5 TABLET ORAL at 08:02

## 2018-02-09 RX ADMIN — INSULIN ASPART 2 UNITS: 100 INJECTION, SOLUTION INTRAVENOUS; SUBCUTANEOUS at 12:02

## 2018-02-09 RX ADMIN — CLINDAMYCIN IN 5 PERCENT DEXTROSE 900 MG: 18 INJECTION, SOLUTION INTRAVENOUS at 06:02

## 2018-02-09 RX ADMIN — METOPROLOL TARTRATE 25 MG: 25 TABLET, FILM COATED ORAL at 08:02

## 2018-02-09 RX ADMIN — OXYCODONE HYDROCHLORIDE 10 MG: 5 TABLET ORAL at 08:02

## 2018-02-09 RX ADMIN — CLINDAMYCIN IN 5 PERCENT DEXTROSE 900 MG: 18 INJECTION, SOLUTION INTRAVENOUS at 02:02

## 2018-02-09 RX ADMIN — CIPROFLOXACIN HYDROCHLORIDE 500 MG: 500 TABLET, FILM COATED ORAL at 08:02

## 2018-02-09 RX ADMIN — LISINOPRIL 20 MG: 20 TABLET ORAL at 08:02

## 2018-02-09 RX ADMIN — Medication 3 ML: at 02:02

## 2018-02-09 NOTE — PROGRESS NOTES
Progress Note    Admit Date: 2/3/2018   LOS: 6 days     SUBJECTIVE:     Follow-up For:  Left humerus fracture    02/09/2018: No fever or chills.   02/08/2018: Denies fever or chills. Mental status better   02/07/2018: some confusion. Tolerating current tx   02/06/2018: Underwent ORIF to LUE. Moderate pain   02/05/2018: left UE pain moderate. Got it splinted yesterday. No fever or chills. +dysuria and constipation      Scheduled Meds:     amLODIPine  10 mg Oral Daily    ciprofloxacin HCl  500 mg Oral Q12H    clindamycin (CLEOCIN) IVPB  900 mg Intravenous Q8H    docusate sodium  100 mg Oral BID    famotidine (PF)  20 mg Intravenous Q12H    latanoprost  1 drop Both Eyes QHS    lisinopril  20 mg Oral Daily    metoprolol tartrate  25 mg Oral BID    sodium chloride 0.9%  3 mL Intravenous Q8H     Continuous Infusions:   sodium chloride 0.9% 75 mL/hr at 02/09/18 0022     PRN Meds:acetaminophen, acetaminophen, dextrose 50%, dextrose 50%, glucagon (human recombinant), glucose, glucose, HYDROmorphone, insulin aspart, ondansetron, ondansetron, oxyCODONE, ramelteon    Review of patient's allergies indicates:   Allergen Reactions    Bactrim [sulfamethoxazole-trimethoprim]     Cephalexin Other (See Comments)     Leg cramps    Codeine Itching    Demerol [meperidine]     Etodolac Diarrhea    Metformin Diarrhea    Naproxen     Sulfur     Terbinafine     Darvocet a500 [propoxyphene n-acetaminophen] Rash       Review of Systems    Constitutional: moderate pain.  Eyes: no visual changes   ENT: no nasal congestion. Denies sore throat with odynophagia   Respiratory: No cough or sob    Cardiovascular: no chest pain or palpitations   Gastrointestinal: + constipation  Hematologic/Lymphatic: No lymphadenopathy, no significant fatigue, fever or night sweat. No mucocutaneous bleeding   Musculoskeletal: see HPI  Neurological: confusion improving   Skin: No rashes or lesions  Psych: chronic anxiety     OBJECTIVE:     Vital  Signs (Most Recent)    Temp: 98 °F (36.7 °C) (02/09/18 0726)  Pulse: 97 (02/09/18 0726)  Resp: 18 (02/09/18 0726)  BP: (!) 152/67 (02/09/18 0726)  SpO2: (!) 93 % (02/09/18 0726)    Vital Signs Range (Last 24H):  Temp:  [97.4 °F (36.3 °C)-98.3 °F (36.8 °C)]   Pulse:  []   Resp:  [18-20]   BP: (112-161)/(56-70)   SpO2:  [92 %-99 %]     I & O (Last 24H):    Intake/Output Summary (Last 24 hours) at 02/09/18 0817  Last data filed at 02/09/18 0500   Gross per 24 hour   Intake           1036.3 ml   Output                0 ml   Net           1036.3 ml     Physical Exam:    General: NAD, resting in bed.    Head: normocephalic, atraumatic   Eyes: conjunctivae pink, anicteric sclera.    Throat: No erythema or post nasal discharge   Neck: supple, no LAD   Lungs: decreased BS at the bases    Heart: S1, S2, RRR   Abdomen: + BS x 4 quadrants, soft, non tender. No hepatosplenomegaly.   Extremities: Left UE in splint and ace wrap. ROM limited    Skin: turgor normal, no erythema or rashes. No abnormal moles  MS: LUE ROM limited with pain.   Neuro: awake, oriented to first name only    Psy: calm      Laboratory:  CBC:     Recent Labs  Lab 02/09/18 0624   WBC 8.23   RBC 3.31*   HGB 10.3*   HCT 31.4*      MCV 95   MCH 31.1*   MCHC 32.8     CMP:     Recent Labs  Lab 02/09/18 0624   *   CALCIUM 8.6*      K 4.4   CO2 23      BUN 16   CREATININE 0.7     Coagulation:     Recent Labs  Lab 02/03/18  1715   LABPROT 10.1   INR 1.0     Cardiac markers:     Recent Labs  Lab 02/04/18  0430   TROPONINI <0.006     ABGs: No results for input(s): PH, PCO2, PO2, HCO3, POCSATURATED, BE in the last 168 hours.  Microbiology Results (last 7 days)     Procedure Component Value Units Date/Time    Urine culture [[id number]]  (Susceptibility) Collected:  02/05/18 9684    Order Status:  Completed Specimen:  Urine from Urine, Clean Catch Updated:  02/08/18 4707     Urine Culture, Routine --     ESCHERICHIA COLI  > 100,000 cfu/ml        Urine Culture, Routine --     BETHANIE ALBICANS  10,000 - 49,999 cfu/ml  Treatment of asymptomatic candiduria is not recommended (except for   specific populations). Candida isolated in the urine typically   represents colonization. If an indwelling urinary catheter is present  it should be removed or replaced.          Specimen (12h ago through future)    None          Recent Labs  Lab 02/05/18  2250   COLORU Yellow   SPECGRAV 1.020   PHUR 5.0   PROTEINUA Negative   BACTERIA Moderate*   NITRITE Positive*   LEUKOCYTESUR 3+*   UROBILINOGEN Negative       Diagnostic Results:    CONCLUSIONS     1 - TDS    2 - Normal left ventricular systolic function (EF 55-60%).     3 - Mild aortic stenosis, CHERI = 1.77 cm2, AVAi = 1.06 cm2/m2, peak velocity = 2.41 m/s, mean gradient = 13 mmHg.             This document has been electronically    SIGNED BY: Yoni Wesley MD On: 02/06/2018 15:35          ASSESSMENT/PLAN:     Active Hospital Problems     Diagnosis   POA    *Closed fracture of left distal humerus [S42.402A]- after a slip and fall accident at daughter's home  - pt has acceptable cardiac risk for this medically   necessary surgical intervention     -  2-D Echo reviewed    - continue Beta blocker tx  - tolerated ORIF-HUMERUS  Superconduit (Left)    - hopefully SNF today       Yes    Acute pain [R52]  - hydromorphone prn     Yes    Fall [W19.XXXA]- high risk of fall again    - keep bed low      Yes    Hypoxia [R09.02]  - cxr no acute pathology   - 2- D Echo ; EF NL  - cards seen pt      Yes    Osteoporosis, 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      DVT prophylaxis: SCDs     Dysuria: Abnormal UA   - culture - E.coli    - Cipro - continue   Until 02/14    Confusional state: pt high risk for delirium an fall    - close monitoring   - almost back to baseline      Discussed with daughter, Ms. Nahed Mathews on 02/07.     Hector Saleh M.D  Internal Medicine & Geriatric Medicine  Hematology & Oncology  Palliative Medicine    [address], [address]  [place] [address]  [phone] (Office)  [phone] (Fax)

## 2018-02-09 NOTE — PROGRESS NOTES
Ortho Daily Progress Note    Ngoc Henderson is a 86 y.o. female admitted on 2/3/2018      Chief Complaint/Reason for admission: Fall (Pt here via EMS for trip and fall, possible arm fracture-swelling noted. pt reports pain 10/10. Pt reports hitting head, denies any LOC, blurry vision. )       Hospital Day: 6  Post Op Day: 4 Days Post-Op     The patient was seen and examined this morning at the bedside. Patient reports no acute issues overnight.  Patient reports that pain is adequately controlled.    _______________    Vitals:    02/09/18 0331 02/09/18 0726 02/09/18 0812 02/09/18 1148   BP: 139/65 (!) 152/67  (!) 120/56   Pulse: 95 97  90   Resp: 20 18 18   Temp: 98 °F (36.7 °C) 98 °F (36.7 °C)  98.6 °F (37 °C)   TempSrc: Oral Oral  Oral   SpO2: 95% (!) 93% (!) 92% 96%   Weight:       Height:           Vital Signs (Most Recent)  Temp: 98.6 °F (37 °C) (02/09/18 1148)  Pulse: 90 (02/09/18 1148)  Resp: 18 (02/09/18 1148)  BP: (!) 120/56 (02/09/18 1148)  SpO2: 96 % (02/09/18 1148)    Vital Signs Range (Last 24H):  Temp:  [97.4 °F (36.3 °C)-98.6 °F (37 °C)]   Pulse:  []   Resp:  [18-20]   BP: (112-158)/(56-70)   SpO2:  [92 %-98 %]       Physical:    AAOx3  Incision/ dressing clean/dry/intact  NVI Distally  Palpable distal pulses  CR<3sec      Recent Labs      02/07/18   0619  02/08/18   0437  02/09/18   0624   K  4.9  4.8  4.4   CALCIUM  8.8  8.9  8.6*   WBC  10.09  7.64  8.23   HGB  10.5*  10.0*  10.3*   HCT  32.3*  31.4*  31.4*   PLT  228  229  239       I/O last 3 completed shifts:  In: 2246.3 [P.O.:720; I.V.:1526.3]  Out: -           Assessment:  A/P POD 4 s/p left  Distal humerus ORIF             Plan:    PT/OT: hand ROM  Pain Control    Discharge Plan: Nursing home        Zack Faria MD  [facility]

## 2018-02-09 NOTE — NURSING
Called report to ANDRE Youssef at [facility] at [phone]. Awaiting transport from [facility].

## 2018-02-09 NOTE — PT/OT/SLP DISCHARGE
Physical Therapy Discharge Summary    Name: Ngoc Henderson  MRN: [id number]   Principal Problem: Closed fracture of left distal humerus     Patient Discharged from acute Physical Therapy on 18.  Please refer to prior PT notes for functional status.     Assessment:     Patient was discharged unexpectedly.  Information required to complete an accurate discharge summary is unknown.  Refer to therapy initial evaluation and last progress note for initial and most recent functional status and goal achievement.  Recommendations made may be found in medical record.    Objective:     GOALS:    Physical Therapy Goals     Not on file          Multidisciplinary Problems (Resolved)        Problem: Physical Therapy Goal    Goal Priority Disciplines Outcome Goal Variances Interventions   Physical Therapy Goal   (Resolved)     PT/OT, PT Outcome(s) achieved     Description:  Goals to be met by: 18     Patient will increase functional independence with mobility by performin. Supine to sit with Moderate Assistance  2. Sit to supine with Moderate Assistance  3. Rolling to Left and Right with Moderate Assistance  4. Sitting at edge of bed x 30 minutes with Stand-by Assistance  5. Lower extremity exercise program x 20 reps per handout, with SBA  6. Sit to stand with CGA/QC  7. Bed to chair with min A/QC  8. Gait 20-30 ft with min A/QC                       Reasons for Discontinuation of Therapy Services  Transfer to alternate level of care.      Plan:     Patient Discharged to: Skilled Nursing Facility.    Catherine Venegas, PT  2018

## 2018-02-09 NOTE — PLAN OF CARE
02/09/18 1222   Final Note   Assessment Type Final Discharge Note   Discharge Disposition SNF   What phone number can be called within the next 1-3 days to see how you are doing after discharge? [phone]   Hospital Follow Up  Appt(s) scheduled? Yes   Discharge plans and expectations educations in teach back method with documentation complete? Yes   Right Care Referral Info   Post Acute Recommendation SNF / Sub-Acute Rehab   Referral Type SNF   Facility Name [facility]

## 2018-02-09 NOTE — PROGRESS NOTES
ESTHER called [facility] ambulance for transport @ [phone].  Ambulance will arrive within the hour.  Nurse, Annelise notified.

## 2018-02-10 NOTE — DISCHARGE SUMMARY
[facility]  IM  Discharge Summary      Patient Name: Ngoc Henderson  MRN: [id number]  Admission Date: 2/3/2018  Hospital Length of Stay: 6 days  Discharge Date and Time: 2/9/2018  5:06 PM  Attending Physician: No att. providers found   Discharging Provider: Hector Saleh MD  Primary Care Provider: Hector Saleh MD    HPI:   Mrs. Henderson is a pleasant 85 YO lady well known to me with multiple medical conditions who resides at [facility]. While visiting her daughter's home she slipped on the wood laila and fell on her left arm. Immediately, felt the pain and family called EMS. She was take to [facility] ED. In the ED, she underwent XRay and it showed complete distal humeral fracture displaced posteriorly by a greater than of cortex with a greater than 4 cm overlap. Moderate to severe pain with ROM. Denies any LOC, cp, sob or palpitation at the time of fall. She states has been compliant with all medications.     Procedure(s) (LRB):  OPEN REDUCTION INTERNAL FIXATION-HUMERUS  Superconduit (Left)     Hospital Course:     During this hospitalization, these following conditions were addressed and managed along with other comorbid conditions:    Active Hospital Problems     Diagnosis   POA    *Closed fracture of left distal humerus [S42.402A]- after a slip and fall accident at daughter's home  - pt has acceptable cardiac risk for this medically   necessary surgical intervention     -  2-D Echo reviewed    - continue Beta blocker tx  - tolerated ORIF-HUMERUS  Superconduit (Left)     - hopefully SNF today       Yes    Acute pain [R52]  - hydromorphone prn   Yes    Fall [W19.XXXA]- high risk of fall again     - keep bed low    Yes    Hypoxia [R09.02]  - cxr no acute pathology   - 2- D Echo ; EF NL  - cards seen pt     Yes    Osteoporosis, postmenopausal [M81.0]   Yes    Diabetes mellitus, type II [E11.9]     - HgA1c is not ideal range for elective procedure, but given  Her current acute event- ok to  proceed with surgery     - POCT glucose < 250         Yes       Resolved Hospital Problems     Diagnosis Date Resolved POA   No resolved problems to display.      DVT prophylaxis: SCDs      Dysuria: Abnormal UA            - culture - E.coli             - Cipro - continue  Until 02/14     Confusional state: pt high risk for delirium an fall             - close monitoring            - almost back to baseline       Discussed with daughter, Ms. Nahed Mathews on 02/07.        Consults:     Orthopedic   Consults         Status Ordering Provider     Inpatient consult to Cardiology  Once     Provider:  Yoni Wesley MD    Completed HECTOR RECINOS          Significant Diagnostic Studies: see above     Pending Diagnostic Studies:     None        Final Active Diagnoses:    Diagnosis Date Noted POA    PRINCIPAL PROBLEM:  Closed fracture of left distal humerus [S42.402A] 02/03/2018 Yes    Acute pain [R52] 02/04/2018 Yes    Fall [W19.XXXA] 02/04/2018 Yes    Hypoxia [R09.02] 02/04/2018 Yes    SOB (shortness of breath) [R06.02] 03/10/2016 Yes    Osteoporosis, postmenopausal [M81.0] 01/11/2013 Yes    Diabetes mellitus, type II [E11.9] 09/20/2012 Yes    Hyperlipidemia [E78.5] 09/20/2012 Yes    Hypertension [I10] 09/20/2012 Yes      Problems Resolved During this Admission:    Diagnosis Date Noted Date Resolved POA      Discharged Condition: stable    Activity: as tolerated     Diet: diabetic     Disposition: Skilled Nursing Facility    Follow Up:  Follow-up Information     Hector Recinos MD In 1 week.    Specialties:  Internal Medicine, Oncology, Hematology and Oncology  Contact information:  [address] [place], [place] [address] [phone]                 Patient Instructions:   No discharge procedures on file.  Medications:  Reconciled Home Medications:   Discharge Medication List as of 2/9/2018  5:07 PM      START taking these medications    Details   acetaminophen (TYLENOL) 325 MG tablet Take 2  tablets (650 mg total) by mouth every 8 (eight) hours as needed., Starting Fri 2/9/2018, Print      ciprofloxacin HCl (CIPRO) 500 MG tablet Take 1 tablet (500 mg total) by mouth every 12 (twelve) hours. Stop date 02/14, Starting Fri 2/9/2018, Print         CONTINUE these medications which have CHANGED    Details   hydrocodone-acetaminophen 5-325mg (NORCO) 5-325 mg per tablet Take 1 tablet by mouth every 8 (eight) hours as needed for Pain. Severe pain, Starting Fri 2/9/2018, Print         CONTINUE these medications which have NOT CHANGED    Details   amlodipine (NORVASC) 10 MG tablet Take 1 tablet (10 mg total) by mouth once daily., Starting Mon 3/21/2016, Until Sat 2/3/2018, Normal      atorvastatin (LIPITOR) 40 MG tablet Take 40 mg by mouth once daily., Historical Med      canagliflozin (INVOKANA) 300 mg Tab tablet Take 300 mg by mouth once daily., Historical Med      docusate sodium (COLACE) 100 MG capsule Take 100 mg by mouth 2 (two) times daily., Historical Med      escitalopram oxalate (LEXAPRO) 10 MG tablet Take 1 tablet (10 mg total) by mouth once daily., Starting Fri 1/22/2016, Until Sat 2/3/2018, Normal      gabapentin (NEURONTIN) 100 MG capsule Take 100 mg by mouth 3 (three) times daily., Until Discontinued, Historical Med      insulin aspart (NOVOLOG) 100 unit/mL injection Inject 20 Units into the skin 3 (three) times daily before meals., Historical Med      LANTUS SOLOSTAR 100 unit/mL (3 mL) InPn pen inject 32 UNITS IN THE MORNING, Normal      latanoprost (XALATAN) 0.005 % ophthalmic solution Place 1 drop into both eyes every evening.  , Until Discontinued, Historical Med      linaclotide (LINZESS) 72 mcg Cap Take by mouth., Historical Med      metoprolol tartrate (LOPRESSOR) 25 MG tablet Take 1 tablet (25 mg total) by mouth 2 (two) times daily., Starting Fri 6/24/2016, Until Sat 2/3/2018, Normal      !! ACCU-CHEK FASTCLIX Misc Starting 5/26/2015, Until Discontinued, Historical Med      alendronate  "(FOSAMAX) 70 MG tablet Starting 1/5/2016, Until Discontinued, Historical Med      !! blood sugar diagnostic (ACCU-CHEK SMARTVIEW TEST STRIP) Strp use with insulin AS DIRECTED as directed, Normal      !! blood sugar diagnostic Strp test TWO TO THREE TIMES DAILY, Historical Med      blood-glucose meter (ACCU-CHEK DORCAS) Misc Please provide glucometer covered by the insurance company, Normal      desonide 0.05% (DESOWEN) 0.05 % Oint VICTOR HUGO AA BID RASH, Historical Med      dextromethorphan-guaifenesin  mg (MUCINEX DM)  mg per 12 hr tablet Take 1 tablet by mouth every 12 (twelve) hours., Until Discontinued, Historical Med      !! EASY TOUCH TWIST LANCETS 32 gauge Misc test DAILY AS DIRECTED, Historical Med      !! FREESTYLE LANCETS 28 gauge lancets USE DAILY, Normal      !! insulin needles, disposable, (NOVOFINE 32) 32 x 1/4 " Ndle Inject 1 Units into the skin once daily., Starting 2/16/2015, Until Discontinued, Normal      !! lancets (FREESTYLE LANCETS) 28 gauge Misc Inject 1 lancet into the skin once daily., Starting 11/18/2013, Until Discontinued, Normal      lisinopril (PRINIVIL,ZESTRIL) 40 MG tablet Take 1 tablet (40 mg total) by mouth once daily., Normal      miconazole (MICOTIN) 2 % cream Apply topically 2 (two) times daily., Starting 12/31/2016, Until Discontinued, Print      !! NOVOFINE 32 32 gauge x 1/4" Ndle Inject 1 Units into the skin once daily., Normal      polyethylene glycol (GLYCOLAX) 17 gram/dose powder Starting 1/5/2016, Until Discontinued, Historical Med      simvastatin (ZOCOR) 40 MG tablet TAKE 1 TABLET BY MOUTH EVERY EVENING, Normal       !! - Potential duplicate medications found. Please discuss with provider.      STOP taking these medications       diphenhydrAMINE (BENADRYL) 25 mg capsule Comments:   Reason for Stopping:         omeprazole (PRILOSEC) 40 MG capsule Comments:   Reason for Stopping:         clindamycin (CLEOCIN) 150 MG capsule Comments:   Reason for Stopping:         " fluconazole (DIFLUCAN) 200 MG Tab Comments:   Reason for Stopping:         fluocinonide (LIDEX) 0.05 % ointment Comments:   Reason for Stopping:         glipiZIDE (GLUCOTROL) 10 MG tablet Comments:   Reason for Stopping:         naproxen (NAPROSYN) 500 MG tablet Comments:   Reason for Stopping:         nitrofurantoin (MACRODANTIN) 100 MG capsule Comments:   Reason for Stopping:         nystatin (MYCOSTATIN) ointment Comments:   Reason for Stopping:         tizanidine 2 mg Cap Comments:   Reason for Stopping:         triamcinolone acetonide 0.1% (KENALOG) 0.1 % ointment Comments:   Reason for Stopping:             Hector Saleh M.D  Internal Medicine & Geriatric Medicine  Hematology & Oncology  Palliative Medicine    [address], [address]  [place] [address]  [phone] (Office)  [phone] (Fax)

## 2018-02-10 NOTE — NURSING
Patient remained free from falls, trauma, and injuries throughout shift. No complaints of nausea or vomiting. Pain controlled with prn analgesics.  L arm dressing clean, dry, and intact; remains in sling. IV removed; catheter intact. [facility] wheelchair transportation picked up patient to bring to [facility]. No acute distress noted.

## 2018-02-11 ENCOUNTER — PATIENT OUTREACH (OUTPATIENT)
Dept: ADMINISTRATIVE | Facility: CLINIC | Age: 83
End: 2018-02-11

## 2025-07-01 NOTE — PLAN OF CARE
02/08/18 0950   Discharge Reassessment   Assessment Type Discharge Planning Reassessment   Do you have any problems affording any of your prescribed medications? No   Discharge Plan A Skilled Nursing Facility  ( sent clinicals to [facility] ([facility]). Currently waiting on [employer] Authorization)   Patient choice form signed by patient/caregiver Yes   Can the patient answer the patient profile reliably? Yes, cognitively intact   How does the patient rate their overall health at the present time? Fair   Describe the patient's ability to walk at the present time. Minor restrictions or changes   How often would a person be available to care for the patient? Often   Number of comorbid conditions (as recorded on the chart) Five or more      Fall with Harm Risk

## (undated) DEVICE — GLOVE SURGICAL LATEX SZ 6.5

## (undated) DEVICE — SUT SILK 2-0 BLK BR KS 30 I

## (undated) DEVICE — SEE MEDLINE ITEM 152522

## (undated) DEVICE — SEE MEDLINE ITEM 146345

## (undated) DEVICE — BLANKET LOWER BODY 55.9X40.2IN

## (undated) DEVICE — PACK ARTHROSCOPY W/ISO BAC

## (undated) DEVICE — SUT CTD VICRYL 0 UND BR CT

## (undated) DEVICE — SUT ETHILON 3/0 18IN PS-1

## (undated) DEVICE — SPLINT PLASTER FS 5IN X 30IN

## (undated) DEVICE — ELECTRODE REM PLYHSV RETURN 9

## (undated) DEVICE — PULSAVAC ZIMMER

## (undated) DEVICE — BIT DRILL QUICK RELEASE 2.8MM

## (undated) DEVICE — SUT 2/0 36IN COATED VICRYL

## (undated) DEVICE — CANISTER SUCTION 2 LTR

## (undated) DEVICE — SUT ETHIBOND XTRA2 OS-4 30

## (undated) DEVICE — GLOVE SURGICAL LATEX SZ 8

## (undated) DEVICE — SUT 1 36IN COATED VICRYL UN

## (undated) DEVICE — SEE MEDLINE ITEM 146271

## (undated) DEVICE — SOL BETADINE 5%

## (undated) DEVICE — SEE MEDLINE ITEM 152515

## (undated) DEVICE — SLING ORTHOPEDIC MEDIUM

## (undated) DEVICE — PADDING CAST SPECIALIST 6X4YD

## (undated) DEVICE — DEV-O-LOOPS MAXI RED

## (undated) DEVICE — PAD CAST SPECIALIST STRL 6

## (undated) DEVICE — CHLORAPREP W TINT 26ML APPL

## (undated) DEVICE — BUCKET PLASTER DISPOSABLE

## (undated) DEVICE — SCREW HEXALOBE 3.5 X 26M
Type: IMPLANTABLE DEVICE | Site: ARM | Status: NON-FUNCTIONAL
Removed: 2018-02-05

## (undated) DEVICE — DRAPE C-ARM FOR MOBILE XRAY

## (undated) DEVICE — GAUZE SPONGE 4X4 12PLY

## (undated) DEVICE — SUT 2/0 18IN COATED VICRYL

## (undated) DEVICE — STAPLER SKIN ROTATING HEAD

## (undated) DEVICE — SYR ONLY LUER LOCK 20CC

## (undated) DEVICE — SEE L#120831

## (undated) DEVICE — Device

## (undated) DEVICE — SEE MEDLINE ITEM 152529

## (undated) DEVICE — IMPLANTABLE DEVICE
Type: IMPLANTABLE DEVICE | Site: ARM | Status: NON-FUNCTIONAL
Removed: 2018-02-05

## (undated) DEVICE — SEE MEDLINE ITEM 157117

## (undated) DEVICE — SEE MEDLINE ITEM 157150

## (undated) DEVICE — BIT DRILL QUICK RELEASE 2.0MM

## (undated) DEVICE — SOL IRR NACL .9% 3000ML